# Patient Record
Sex: FEMALE | Race: WHITE | NOT HISPANIC OR LATINO | Employment: FULL TIME | URBAN - METROPOLITAN AREA
[De-identification: names, ages, dates, MRNs, and addresses within clinical notes are randomized per-mention and may not be internally consistent; named-entity substitution may affect disease eponyms.]

---

## 2017-03-18 ENCOUNTER — TELEPHONE (OUTPATIENT)
Dept: FAMILY MEDICINE | Facility: CLINIC | Age: 22
End: 2017-03-18

## 2017-04-23 DIAGNOSIS — L70.0 ACNE VULGARIS: ICD-10-CM

## 2017-04-24 NOTE — TELEPHONE ENCOUNTER
Lakisha      Last Written Prescription Date: 05/18/2016 #90 x 3  Last filled 01/31/2017  Last Office Visit with [facility], [facility] or [facility] prescribing provider: 08/18/2016 JUAN JOSE Diaz

## 2017-04-26 RX ORDER — DROSPIRENONE AND ETHINYL ESTRADIOL 0.02-3(28)
1 KIT ORAL DAILY
Qty: 30 TABLET | Refills: 0 | Status: SHIPPED | OUTPATIENT
Start: 2017-04-26 | End: 2017-08-22

## 2017-08-22 ENCOUNTER — OFFICE VISIT (OUTPATIENT)
Dept: FAMILY MEDICINE | Facility: CLINIC | Age: 22
End: 2017-08-22
Payer: COMMERCIAL

## 2017-08-22 VITALS
HEIGHT: 65 IN | SYSTOLIC BLOOD PRESSURE: 114 MMHG | HEART RATE: 71 BPM | TEMPERATURE: 98 F | DIASTOLIC BLOOD PRESSURE: 66 MMHG | WEIGHT: 136 LBS | BODY MASS INDEX: 22.66 KG/M2 | OXYGEN SATURATION: 100 %

## 2017-08-22 DIAGNOSIS — R05.9 COUGH: ICD-10-CM

## 2017-08-22 DIAGNOSIS — Z00.00 ENCOUNTER FOR ROUTINE ADULT HEALTH EXAMINATION WITHOUT ABNORMAL FINDINGS: Primary | ICD-10-CM

## 2017-08-22 DIAGNOSIS — Z30.011 ENCOUNTER FOR INITIAL PRESCRIPTION OF CONTRACEPTIVE PILLS: ICD-10-CM

## 2017-08-22 PROCEDURE — 99395 PREV VISIT EST AGE 18-39: CPT | Performed by: PHYSICIAN ASSISTANT

## 2017-08-22 RX ORDER — LEVONORGESTREL/ETHIN.ESTRADIOL 0.1-0.02MG
1 TABLET ORAL DAILY
Qty: 84 TABLET | Refills: 3 | Status: SHIPPED | OUTPATIENT
Start: 2017-08-22 | End: 2018-07-16

## 2017-08-22 NOTE — MR AVS SNAPSHOT
After Visit Summary   8/22/2017    Vira Salgado    MRN: [id number]           Patient Information     Date Of Birth          1995        Visit Information        Provider Department      8/22/2017 9:20 AM Obdulia Collier PA-C [facility]        Today's Diagnoses     Encounter for initial prescription of contraceptive pills    -  1      Care Instructions      Preventive Health Recommendations  Female Ages 18 to 25     Yearly exam:     See your health care provider every year in order to  o Review health changes.   o Discuss preventive care.    o Review your medicines if your doctor has prescribed any.      You should be tested each year for STDs (sexually transmitted diseases).       After age 20, talk to your provider about how often you should have cholesterol testing.      Starting at age 21, get a Pap test every three years. If you have an abnormal result, your doctor may have you test more often.      If you are at risk for diabetes, you should have a diabetes test (fasting glucose).     Shots:     Get a flu shot each year.     Get a tetanus shot every 10 years.     Consider getting the shot (vaccine) that prevents cervical cancer (Gardasil).    Nutrition:     Eat at least 5 servings of fruits and vegetables each day.    Eat whole-grain bread, whole-wheat pasta and brown rice instead of white grains and rice.    Talk to your provider about Calcium and Vitamin D.     Lifestyle    Exercise at least 150 minutes a week each week (30 minutes a day, 5 days a week). This will help you control your weight and prevent disease.    Limit alcohol to one drink per day.    No smoking.     Wear sunscreen to prevent skin cancer.    See your dentist every six months for an exam and cleaning.          Follow-ups after your visit        Who to contact     Normal or non-critical lab and imaging results will be communicated to you by MyChart, letter or phone within 4 business days after the  "clinic has received the results. If you do not hear from us within 7 days, please contact the clinic through [employer] or phone. If you have a critical or abnormal lab result, we will notify you by phone as soon as possible.  Submit refill requests through [employer] or call your pharmacy and they will forward the refill request to us. Please allow 3 business days for your refill to be completed.          If you need to speak with a  for additional information , please call: [phone]             Additional Information About Your Visit        [employer] Information     [employer] gives you secure access to your electronic health record. If you see a primary care provider, you can also send messages to your care team and make appointments. If you have questions, please call your primary care clinic.  If you do not have a primary care provider, please call [phone] and they will assist you.        Care EveryWhere ID     This is your Care EveryWhere ID. This could be used by other organizations to access your [place] medical records  [id number]        Your Vitals Were     Pulse Temperature Height Pulse Oximetry BMI (Body Mass Index)       71 98  F (36.7  C) (Tympanic) 5' 5.25\" (1.657 m) 100% 22.46 kg/m2        Blood Pressure from Last 3 Encounters:   08/22/17 114/66   08/18/16 110/72   05/18/16 108/74    Weight from Last 3 Encounters:   08/22/17 136 lb (61.7 kg)   08/18/16 149 lb 8 oz (67.8 kg)   05/18/16 145 lb 3.2 oz (65.9 kg)              Today, you had the following     No orders found for display         Today's Medication Changes          These changes are accurate as of: 8/22/17 10:04 AM.  If you have any questions, ask your nurse or doctor.               Start taking these medicines.        Dose/Directions    levonorgestrel-ethinyl estradiol 0.1-20 MG-MCG per tablet   Commonly known as:  KRZYSZTOF CHILDS LESSINA   Used for:  Encounter for initial prescription of contraceptive pills   Started by:  " Obdulia Collier PA-C        Dose:  1 tablet   Take 1 tablet by mouth daily   Quantity:  84 tablet   Refills:  3            Where to get your medicines      These medications were sent to [facility] - [address] [address]     Phone:  [phone]     levonorgestrel-ethinyl estradiol 0.1-20 MG-MCG per tablet                Primary Care Provider Office Phone # Fax #    Raquel Corinne Diaz PA-C [phone] [address] [place] [address]        Equal Access to Services     [facility]: Hadii aad ku hadasho Sojumanaali, waaxda luqadaha, qaybta kaalmada adeegyada, darlene marks . So [facility] [phone].    ATENCIÓN: Si habla español, tiene a white disposición servicios gratuitos de asistencia lingüística. [facility] [phone].    We comply with applicable federal civil rights laws and [place] laws. We do not discriminate on the basis of race, color, national origin, age, disability sex, sexual orientation or gender identity.            Thank you!     Thank you for choosing [facility]  for your care. Our goal is always to provide you with excellent care. Hearing back from our patients is one way we can continue to improve our services. Please take a few minutes to complete the written survey that you may receive in the mail after your visit with us. Thank you!             Your Updated Medication List - Protect others around you: Learn how to safely use, store and throw away your medicines at www.disposemymeds.org.          This list is accurate as of: 8/22/17 10:04 AM.  Always use your most recent med list.                   Brand Name Dispense Instructions for use Diagnosis    levonorgestrel-ethinyl estradiol 0.1-20 MG-MCG per tablet    AVIANE,ALESSE,LESSINA    84 tablet    Take 1 tablet by mouth daily    Encounter for initial prescription of contraceptive pills        triamcinolone 0.1 % cream    KENALOG    15 g    Apply topically 3 times daily Apply to the back of the head    Eczema

## 2017-08-22 NOTE — PROGRESS NOTES
SUBJECTIVE:   CC: Vira Salgado is an 21 year old woman who presents for preventive health visit.     [facility] for HPI/ROS submitted by the patient on 8/15/2017   Annual Exam:  Getting at least 3 servings of Calcium per day:: Yes  Bi-annual eye exam:: NO  Dental care twice a year:: NO  Sleep apnea or symptoms of sleep apnea:: None  Diet:: Regular (no restrictions)  Frequency of exercise:: 2-3 days/week  Taking medications regularly:: Yes  Medication side effects:: Not applicable  Additional concerns today:: No  PHQ-2 Score: 0  Duration of exercise:: 15-30 minutes      She has had a cough that has been ongoing for 1 month. It has not gotten better.   No congestion  No SOB or wheezing  Feels like she needs to clear her throat or her voice will get hoarse and coughing will help clear it  No fevers  Feeling well otherwise  Does have h/o allergies - usually in the fall and then to some animals  Not currently taking anything    Would also like to restart birth control  Was on it in the past - more for acne  Getting  next year so would like to restart it now  Not sexually active - has not been sexually active; waiting until marriage      Today's PHQ-2 Score:   PHQ-2 ( 1999 Pf[employer]) 8/15/2017 5/18/2016   Q1: Little interest or pleasure in doing things 0 0   Q2: Feeling down, depressed or hopeless 0 0   PHQ-2 Score 0 0   Q1: Little interest or pleasure in doing things Not at all -   Q2: Feeling down, depressed or hopeless Not at all -   PHQ-2 Score 0 -       Abuse: Current or Past(Physical, Sexual or Emotional)- No  Do you feel safe in your environment - Yes    Social History   Substance Use Topics     Smoking status: Never Smoker     Smokeless tobacco: Never Used     Alcohol use No     The patient does not drink >3 drinks per day nor >7 drinks per week.    Reviewed orders with patient.  Reviewed health maintenance and updated orders accordingly - Yes  BP Readings from Last 3 Encounters:   08/22/17  "114/66   08/18/16 110/72   05/18/16 108/74    Wt Readings from Last 3 Encounters:   08/22/17 136 lb (61.7 kg)   08/18/16 149 lb 8 oz (67.8 kg)   05/18/16 145 lb 3.2 oz (65.9 kg)                      Mammogram not appropriate for this patient based on age.    Pertinent mammograms are reviewed under the imaging tab.  History of abnormal Pap smear: NO - age 21-29 PAP every 3 years recommended  Patient declining today - says she was told by her last provider she did not need one until she became sexually active    Reviewed and updated as needed this visit by clinical staff  Tobacco  Allergies  Meds  Med Hx  Surg Hx  Fam Hx  Soc Hx        Reviewed and updated as needed this visit by Provider              ROS:  C: NEGATIVE for fever, chills, change in weight  I: NEGATIVE for worrisome rashes, moles or lesions  E: NEGATIVE for vision changes or irritation  ENT: NEGATIVE for ear, mouth and throat problems  R: NEGATIVE for significant cough or SOB  B: NEGATIVE for masses, tenderness or discharge  CV: NEGATIVE for chest pain, palpitations or peripheral edema  GI: NEGATIVE for nausea, abdominal pain, heartburn, or change in bowel habits  : NEGATIVE for unusual urinary or vaginal symptoms. Periods are regular.  M: NEGATIVE for significant arthralgias or myalgia  N: NEGATIVE for weakness, dizziness or paresthesias  P: NEGATIVE for changes in mood or affect    OBJECTIVE:   /66 (BP Location: Right arm, Patient Position: Chair, Cuff Size: Adult Regular)  Pulse 71  Temp 98  F (36.7  C) (Tympanic)  Ht 5' 5.25\" (1.657 m)  Wt 136 lb (61.7 kg)  SpO2 100%  BMI 22.46 kg/m2  EXAM:  GENERAL: healthy, alert and no distress  EYES: Eyes grossly normal to inspection, PERRL and conjunctivae and sclerae normal  HENT: ear canals and TM's normal, nose and mouth without ulcers or lesions  NECK: no adenopathy, no asymmetry, masses, or scars and thyroid normal to palpation  RESP: lungs clear to auscultation - no rales, rhonchi or " "wheezes  BREAST: normal without masses, tenderness or nipple discharge and no palpable axillary masses or adenopathy  CV: regular rate and rhythm, normal S1 S2, no S3 or S4, no murmur, click or rub, no peripheral edema and peripheral pulses strong  ABDOMEN: soft, nontender, no hepatosplenomegaly, no masses and bowel sounds normal  MS: no gross musculoskeletal defects noted, no edema  SKIN: no suspicious lesions or rashes  NEURO: Normal strength and tone, mentation intact and speech normal  PSYCH: mentation appears normal, affect normal/bright    ASSESSMENT/PLAN:   (Z00.00) Encounter for routine adult health examination without abnormal findings  (primary encounter diagnosis)  Comment:   Plan:     (R05) Cough  Comment: no red flags on exam  Plan: Start zyrtec daily to see if any improvement. Would expect that either because of the zyrtec or more time, cough would be improving over the next 2-3 weeks. If not or any worsening, follow up    (Z30.011) Encounter for initial prescription of contraceptive pills  Comment:   Plan: levonorgestrel-ethinyl estradiol         (AVIANE,ALESSE,LESSINA) 0.1-20 MG-MCG per         tablet              COUNSELING:   Reviewed preventive health counseling, as reflected in patient instructions       Regular exercise       Healthy diet/nutrition       Contraception       reports that she has never smoked. She has never used smokeless tobacco.    Estimated body mass index is 22.46 kg/(m^2) as calculated from the following:    Height as of this encounter: 5' 5.25\" (1.657 m).    Weight as of this encounter: 136 lb (61.7 kg).         Counseling Resources:  ATP IV Guidelines  Pooled Cohorts Equation Calculator  Breast Cancer Risk Calculator  FRAX Risk Assessment  ICSI Preventive Guidelines  Dietary Guidelines for Americans, 2010  USDA's MyPlate  ASA Prophylaxis  Lung CA Screening    Obdulia Collier PA-C  "

## 2017-08-22 NOTE — NURSING NOTE
"Chief Complaint   Patient presents with     Physical       Initial /66 (BP Location: Right arm, Patient Position: Chair, Cuff Size: Adult Regular)  Pulse 71  Temp 98  F (36.7  C) (Tympanic)  Ht 5' 5.25\" (1.657 m)  Wt 136 lb (61.7 kg)  SpO2 100%  BMI 22.46 kg/m2 Estimated body mass index is 22.46 kg/(m^2) as calculated from the following:    Height as of this encounter: 5' 5.25\" (1.657 m).    Weight as of this encounter: 136 lb (61.7 kg).  Medication Reconciliation: complete   Michael Huang CMA    "

## 2017-10-22 ENCOUNTER — HEALTH MAINTENANCE LETTER (OUTPATIENT)
Age: 22
End: 2017-10-22

## 2017-12-28 ENCOUNTER — OFFICE VISIT (OUTPATIENT)
Dept: DERMATOLOGY | Facility: CLINIC | Age: 22
End: 2017-12-28
Payer: COMMERCIAL

## 2017-12-28 VITALS
HEART RATE: 76 BPM | OXYGEN SATURATION: 100 % | BODY MASS INDEX: 21.47 KG/M2 | WEIGHT: 130 LBS | DIASTOLIC BLOOD PRESSURE: 77 MMHG | SYSTOLIC BLOOD PRESSURE: 130 MMHG

## 2017-12-28 DIAGNOSIS — L70.0 ACNE VULGARIS: Primary | ICD-10-CM

## 2017-12-28 LAB — BETA HCG QUAL IFA URINE: NEGATIVE

## 2017-12-28 PROCEDURE — 99203 OFFICE O/P NEW LOW 30 MIN: CPT | Performed by: PHYSICIAN ASSISTANT

## 2017-12-28 PROCEDURE — 84703 CHORIONIC GONADOTROPIN ASSAY: CPT | Performed by: PHYSICIAN ASSISTANT

## 2017-12-28 RX ORDER — DROSPIRENONE AND ETHINYL ESTRADIOL 0.02-3(28)
KIT ORAL
COMMUNITY
Start: 2017-04-26 | End: 2017-12-28 | Stop reason: ALTCHOICE

## 2017-12-28 NOTE — LETTER
12/28/2017         RE: Vira Salgado  [address] [address]        Dear Colleague,    Thank you for referring your patient, Vira Salgado, to the [facility]. Please see a copy of my visit note below.    Vira Salgado is a 22 year old year old female patient here today for acne vulgaris.  Patient is interested in starting accutane. She reports that she has been treating acne she was 12 years old. She reports that she has tried different prescription topical antibiotics and retinoids. She reports that Verenice initially worked well for her Merkue but then stopped working. Currently she is on Aviane birth control and is using clean and clear wash. She reports that she is not sexually active. She would like to get her skin clear before her wedding in July.  Patient has no other skin complaints today.  Remainder of the HPI, Meds, PMH, Allergies, FH, and SH was reviewed in chart.    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   History reviewed. No pertinent past medical history.    Past Surgical History:   Procedure Laterality Date     NO HISTORY OF SURGERY          Family History   Problem Relation Age of Onset     Blood Disease Father      leukemia     CEREBROVASCULAR DISEASE Father      Hypertension Mother      Thyroid Disease Mother      DIABETES Paternal Grandmother      Hypertension Paternal Grandmother      Asthma No family hx of      C.A.D. No family hx of      Breast Cancer No family hx of      Cancer - colorectal No family hx of      Prostate Cancer No family hx of        Social History     Social History     Marital status: Single     Spouse name: N/A     Number of children: 0     Years of education: 12+     Occupational History      Student     Social History Main Topics     Smoking status: Never Smoker     Smokeless tobacco: Never Used     Alcohol use No     Drug use: No     Sexual  activity: No     Other Topics Concern     Parent/Sibling W/ Cabg, Mi Or Angioplasty Before 65f 55m? No     Social History Narrative       Outpatient Encounter Prescriptions as of 12/28/2017   Medication Sig Dispense Refill     levonorgestrel-ethinyl estradiol (AVIANE,ALESSE,LESSINA) 0.1-20 MG-MCG per tablet Take 1 tablet by mouth daily 84 tablet 3     [DISCONTINUED] GIANVI 3-0.02 MG per tablet        triamcinolone (KENALOG) 0.1 % cream Apply topically 3 times daily Apply to the back of the head (Patient not taking: Reported on 12/28/2017) 15 g 0     No facility-administered encounter medications on file as of 12/28/2017.              Review Of Systems  Skin: As above  Eyes: negative  Ears/Nose/Throat: negative  Respiratory: No shortness of breath, dyspnea on exertion, cough, or hemoptysis  Cardiovascular: negative  Gastrointestinal: negative  Genitourinary: negative  Musculoskeletal: negative  Neurologic: negative  Psychiatric: negative  Hematologic/Lymphatic/Immunologic: negative  Endocrine: negative      O:   NAD, WDWN, Alert & Oriented, Mood & Affect wnl, Vitals stable   Here today alone   /77  Pulse 76  Wt 59 kg (130 lb)  SpO2 100%  BMI 21.47 kg/m2   General appearance normal   Vitals stable   Alert, oriented and in no acute distress      1+ inflammatory nodules and papules along lower cheeks, jawline and chin      Eyes: Conjunctivae/lids:Normal     ENT: Lips    MSK:[place]    Pulm: Breathing Normal    Neuro/Psych: Orientation:Normal; Mood/Affect:Normal  A/P:  1. Acne Vulgaris  Patient would like to start isotretinoin, will check urine hcg today.   Goal dosage: 1624-2981 mg   Patient is abstinent.   Standing CBC, CMP and fasting lipids  Ipledge reviewed with patient and Ipledge consent form complete  Patient place in ipledge system  Ipledge:[id number]  Return to clinic 30 days  Dry lips and mouth, minor swelling of the eyelids or lips, crusty skin, nosebleeds, GI upset, or thinning of hair may occur.  If any of these effects persist or worsen, tell your doctor or pharmacist promptly.   To relieve dry mouth, suck on (sugarless) hard candy or ice chips, chew (sugarless) gum, drink water.   Remember that your doctor has prescribed this medication because he or she has judged that the benefit to you is greater than the risk of side effects. Many people using this medication do not have serious side effects.   Contact office immediately if you have any of these unlikely but serious side effects: mental/mood changes (e.g., depression,  aggressive or violent behavior, and in rare cases, thoughts of suicide), tingling feeling in the skin, quick/severe sun sensitivity, back/joint/muscle pain, signs of infection (e.g., fever, persistent sore throat, painful swallowing, peeling skin on palms/soles.   Isotretinoin may infrequently cause disease of the pancreatitis, that may rarely be fatal. Stop taking this medication and contact office immediately if you develop: severe stomach pain severe or persistent GI upset,   Stop taking this medication and tell your doctor immediately if you develop these unlikely but very serious side effects: severe headache, vision changes, ear ringing, hearling loss, chest pain, yellowing eyes, skin, dark urine, severe diarrhea, rectal bleeding,   Seek immediate medical attention if you notice any symptoms of a serious allergic reaction.    Accutane is discussed fully with the patient. It is a very effective drug to treat acne vulgaris but has many potential significant side effects. Chief among these are teratogensis, hepatic injury, dyslipidemia and severe drying of the mucous membranes. All of these issues have been discussed in details. Monthly blood tests to monitor lipids and liver functions will be necessary. Expect painful dryness and/or fissuring around the lips, eyes, and other moist areas of the body. Balms may be protective. Contact lens may be too painful to wear temporarily while on  this drug. Episodes of significant depression have been reported, including suicidal ideation and attempts in rare cases. It may also cause pseudotumor cerebri and hyperostosis. The patient will report any such changes in mood, depressive symptoms or suicidal thoughts, headaches, joint or bone pains. There is also a possible association with inflammatory bowel disease, although this is unproven at this point.       Again, thank you for allowing me to participate in the care of your patient.        Sincerely,        Marie Arellano PA-C

## 2017-12-28 NOTE — NURSING NOTE
"Initial /77  Pulse 76  Wt 59 kg (130 lb)  SpO2 100%  BMI 21.47 kg/m2 Estimated body mass index is 21.47 kg/(m^2) as calculated from the following:    Height as of 8/22/17: 1.657 m (5' 5.25\").    Weight as of this encounter: 59 kg (130 lb). .    Annia Fu LPN    "

## 2017-12-28 NOTE — MR AVS SNAPSHOT
After Visit Summary   12/28/2017    Vira Salgado    MRN: [id number]           Patient Information     Date Of Birth          1995        Visit Information        Provider Department      12/28/2017 10:40 AM Marie Larson PA-C [facility]        Today's Diagnoses     Acne vulgaris    -  1       Follow-ups after your visit        Future tests that were ordered for you today     Open Standing Orders        Priority Remaining Interval Expires Ordered    Beta HCG qual IFA urine Routine 9/9 monthly  12/28/2018 12/28/2017    CBC with platelets Routine 9/9 monthly  12/28/2018 12/28/2017    Comprehensive metabolic panel Routine 9/9 monthly  12/28/2018 12/28/2017    Lipid panel reflex to direct LDL Fasting Routine 9/9 monthly 12/28/2018 12/28/2017            Who to contact     If you have questions or need follow up information about today's clinic visit or your schedule please contact [facility] directly at [phone].  Normal or non-critical lab and imaging results will be communicated to you by [employer], letter or phone within 4 business days after the clinic has received the results. If you do not hear from us within 7 days, please contact the clinic through [employer] or phone. If you have a critical or abnormal lab result, we will notify you by phone as soon as possible.  Submit refill requests through [employer] or call your pharmacy and they will forward the refill request to us. Please allow 3 business days for your refill to be completed.          Additional Information About Your Visit        MyChart Information     [employer] gives you secure access to your electronic health record. If you see a primary care provider, you can also send messages to your care team and make appointments. If you have questions, please call your primary care clinic.  If you do not have a primary care provider, please call [phone] and they will assist you.        Care EveryWhere ID      This is your Care EveryWhere ID. This could be used by other organizations to access your [place] medical records  [id number]        Your Vitals Were     Pulse Pulse Oximetry BMI (Body Mass Index)             76 100% 21.47 kg/m2          Blood Pressure from Last 3 Encounters:   12/28/17 130/77   08/22/17 114/66   08/18/16 110/72    Weight from Last 3 Encounters:   12/28/17 59 kg (130 lb)   08/22/17 61.7 kg (136 lb)   08/18/16 67.8 kg (149 lb 8 oz)              We Performed the Following     Beta HCG qual IFA urine          Today's Medication Changes          These changes are accurate as of: 12/28/17 10:32 PM.  If you have any questions, ask your nurse or doctor.               Stop taking these medicines if you haven't already. Please contact your care team if you have questions.     GIANVI 3-0.02 MG per tablet   Generic drug:  drospirenone-ethinyl estradiol   Stopped by:  Marie Larson PA-C                    Primary Care Provider Office Phone # Fax #    Raquel Corinne Diaz PA-C [phone] [address] [place] [facility] [address]        Equal Access to Services     [facility]: Hadii reyes cherryo Socamille, waaxda luqadaha, qaybta kaalmada adeegyada, darlene campbell. So [facility] [phone].    ATENCIÓN: Si habla español, tiene a white disposición servicios gratuitos de asistencia lingüística. Gab al [phone].    We comply with applicable federal civil rights laws and [place] laws. We do not discriminate on the basis of race, color, national origin, age, disability, sex, sexual orientation, or gender identity.            Thank you!     Thank you for choosing [facility]  for your care. Our goal is always to provide you with excellent care. Hearing back from our patients is one way we can continue to improve our services. Please take a few minutes to complete the written survey that you may receive in the mail after your visit with us.  Thank you!             Your Updated Medication List - Protect others around you: Learn how to safely use, store and throw away your medicines at www.disposemymeds.org.          This list is accurate as of: 12/28/17 10:32 PM.  Always use your most recent med list.                   Brand Name Dispense Instructions for use Diagnosis    levonorgestrel-ethinyl estradiol 0.1-20 MG-MCG per tablet    AVIANE,ALESSE,LESSINA    84 tablet    Take 1 tablet by mouth daily    Encounter for initial prescription of contraceptive pills       triamcinolone 0.1 % cream    KENALOG    15 g    Apply topically 3 times daily Apply to the back of the head    Eczema

## 2017-12-29 NOTE — PROGRESS NOTES
Vira Salgado is a 22 year old year old female patient here today for acne vulgaris.  Patient is interested in starting accutane. She reports that she has been treating acne she was 12 years old. She reports that she has tried different prescription topical antibiotics and retinoids. She reports that Verenice initially worked well for her ance but then stopped working. Currently she is on Aviane birth control and is using clean and clear wash. She reports that she is not sexually active. She would like to get her skin clear before her wedding in July.  Patient has no other skin complaints today.  Remainder of the HPI, Meds, PMH, Allergies, FH, and SH was reviewed in chart.    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   History reviewed. No pertinent past medical history.    Past Surgical History:   Procedure Laterality Date     NO HISTORY OF SURGERY          Family History   Problem Relation Age of Onset     Blood Disease Father      leukemia     CEREBROVASCULAR DISEASE Father      Hypertension Mother      Thyroid Disease Mother      DIABETES Paternal Grandmother      Hypertension Paternal Grandmother      Asthma No family hx of      C.A.D. No family hx of      Breast Cancer No family hx of      Cancer - colorectal No family hx of      Prostate Cancer No family hx of        Social History     Social History     Marital status: Single     Spouse name: N/A     Number of children: 0     Years of education: 12+     Occupational History      Student     Social History Main Topics     Smoking status: Never Smoker     Smokeless tobacco: Never Used     Alcohol use No     Drug use: No     Sexual activity: No     Other Topics Concern     Parent/Sibling W/ Cabg, Mi Or Angioplasty Before 65f 55m? No     Social History Narrative       Outpatient Encounter Prescriptions as of 12/28/2017   Medication Sig Dispense Refill      levonorgestrel-ethinyl estradiol (AVIANE,ALESSE,LESSINA) 0.1-20 MG-MCG per tablet Take 1 tablet by mouth daily 84 tablet 3     [DISCONTINUED] GIANVI 3-0.02 MG per tablet        triamcinolone (KENALOG) 0.1 % cream Apply topically 3 times daily Apply to the back of the head (Patient not taking: Reported on 12/28/2017) 15 g 0     No facility-administered encounter medications on file as of 12/28/2017.              Review Of Systems  Skin: As above  Eyes: negative  Ears/Nose/Throat: negative  Respiratory: No shortness of breath, dyspnea on exertion, cough, or hemoptysis  Cardiovascular: negative  Gastrointestinal: negative  Genitourinary: negative  Musculoskeletal: negative  Neurologic: negative  Psychiatric: negative  Hematologic/Lymphatic/Immunologic: negative  Endocrine: negative      O:   NAD, WDWN, Alert & Oriented, Mood & Affect wnl, Vitals stable   Here today alone   /77  Pulse 76  Wt 59 kg (130 lb)  SpO2 100%  BMI 21.47 kg/m2   General appearance normal   Vitals stable   Alert, oriented and in no acute distress      1+ inflammatory nodules and papules along lower cheeks, jawline and chin      Eyes: Conjunctivae/lids:Normal     ENT: Lips    MSK:[place]    Pulm: Breathing Normal    Neuro/Psych: Orientation:Normal; Mood/Affect:Normal  A/P:  1. Acne Vulgaris  Patient would like to start isotretinoin, will check urine hcg today.   Goal dosage: 7968-8965 mg   Patient is abstinent.   Standing CBC, CMP and fasting lipids  Ipledge reviewed with patient and Ipledge consent form complete  Patient place in ipledge system  Ipledge:[id number]  Return to clinic 30 days  Dry lips and mouth, minor swelling of the eyelids or lips, crusty skin, nosebleeds, GI upset, or thinning of hair may occur. If any of these effects persist or worsen, tell your doctor or pharmacist promptly.   To relieve dry mouth, suck on (sugarless) hard candy or ice chips, chew (sugarless) gum, drink water.   Remember that your doctor has  prescribed this medication because he or she has judged that the benefit to you is greater than the risk of side effects. Many people using this medication do not have serious side effects.   Contact office immediately if you have any of these unlikely but serious side effects: mental/mood changes (e.g., depression,  aggressive or violent behavior, and in rare cases, thoughts of suicide), tingling feeling in the skin, quick/severe sun sensitivity, back/joint/muscle pain, signs of infection (e.g., fever, persistent sore throat, painful swallowing, peeling skin on palms/soles.   Isotretinoin may infrequently cause disease of the pancreatitis, that may rarely be fatal. Stop taking this medication and contact office immediately if you develop: severe stomach pain severe or persistent GI upset,   Stop taking this medication and tell your doctor immediately if you develop these unlikely but very serious side effects: severe headache, vision changes, ear ringing, hearling loss, chest pain, yellowing eyes, skin, dark urine, severe diarrhea, rectal bleeding,   Seek immediate medical attention if you notice any symptoms of a serious allergic reaction.    Accutane is discussed fully with the patient. It is a very effective drug to treat acne vulgaris but has many potential significant side effects. Chief among these are teratogensis, hepatic injury, dyslipidemia and severe drying of the mucous membranes. All of these issues have been discussed in details. Monthly blood tests to monitor lipids and liver functions will be necessary. Expect painful dryness and/or fissuring around the lips, eyes, and other moist areas of the body. Balms may be protective. Contact lens may be too painful to wear temporarily while on this drug. Episodes of significant depression have been reported, including suicidal ideation and attempts in rare cases. It may also cause pseudotumor cerebri and hyperostosis. The patient will report any such changes  in mood, depressive symptoms or suicidal thoughts, headaches, joint or bone pains. There is also a possible association with inflammatory bowel disease, although this is unproven at this point.

## 2018-01-25 ENCOUNTER — TELEPHONE (OUTPATIENT)
Dept: FAMILY MEDICINE | Facility: CLINIC | Age: 23
End: 2018-01-25

## 2018-01-26 ENCOUNTER — OFFICE VISIT (OUTPATIENT)
Dept: DERMATOLOGY | Facility: CLINIC | Age: 23
End: 2018-01-26
Payer: COMMERCIAL

## 2018-01-26 VITALS — OXYGEN SATURATION: 100 % | SYSTOLIC BLOOD PRESSURE: 118 MMHG | DIASTOLIC BLOOD PRESSURE: 69 MMHG | HEART RATE: 70 BPM

## 2018-01-26 DIAGNOSIS — L70.0 ACNE VULGARIS: ICD-10-CM

## 2018-01-26 DIAGNOSIS — L70.0 ACNE VULGARIS: Primary | ICD-10-CM

## 2018-01-26 LAB
ALBUMIN SERPL-MCNC: 3.8 G/DL (ref 3.4–5)
ALP SERPL-CCNC: 63 U/L (ref 40–150)
ALT SERPL W P-5'-P-CCNC: 12 U/L (ref 0–50)
ANION GAP SERPL CALCULATED.3IONS-SCNC: 5 MMOL/L (ref 3–14)
AST SERPL W P-5'-P-CCNC: 14 U/L (ref 0–45)
BETA HCG QUAL IFA URINE: NEGATIVE
BILIRUB SERPL-MCNC: 1.4 MG/DL (ref 0.2–1.3)
BUN SERPL-MCNC: 9 MG/DL (ref 7–30)
CALCIUM SERPL-MCNC: 8.9 MG/DL (ref 8.5–10.1)
CHLORIDE SERPL-SCNC: 105 MMOL/L (ref 94–109)
CHOLEST SERPL-MCNC: 196 MG/DL
CO2 SERPL-SCNC: 26 MMOL/L (ref 20–32)
CREAT SERPL-MCNC: 0.83 MG/DL (ref 0.52–1.04)
ERYTHROCYTE [DISTWIDTH] IN BLOOD BY AUTOMATED COUNT: 12.7 % (ref 10–15)
GFR SERPL CREATININE-BSD FRML MDRD: 84 ML/MIN/1.7M2
GLUCOSE SERPL-MCNC: 84 MG/DL (ref 70–99)
HCT VFR BLD AUTO: 40.7 % (ref 35–47)
HDLC SERPL-MCNC: 56 MG/DL
HGB BLD-MCNC: 13.3 G/DL (ref 11.7–15.7)
LDLC SERPL CALC-MCNC: 111 MG/DL
MCH RBC QN AUTO: 29.4 PG (ref 26.5–33)
MCHC RBC AUTO-ENTMCNC: 32.7 G/DL (ref 31.5–36.5)
MCV RBC AUTO: 90 FL (ref 78–100)
NONHDLC SERPL-MCNC: 140 MG/DL
PLATELET # BLD AUTO: 249 10E9/L (ref 150–450)
POTASSIUM SERPL-SCNC: 3.8 MMOL/L (ref 3.4–5.3)
PROT SERPL-MCNC: 7.5 G/DL (ref 6.8–8.8)
RBC # BLD AUTO: 4.53 10E12/L (ref 3.8–5.2)
SODIUM SERPL-SCNC: 136 MMOL/L (ref 133–144)
TRIGL SERPL-MCNC: 146 MG/DL
WBC # BLD AUTO: 8 10E9/L (ref 4–11)

## 2018-01-26 PROCEDURE — 80061 LIPID PANEL: CPT | Performed by: PHYSICIAN ASSISTANT

## 2018-01-26 PROCEDURE — 85027 COMPLETE CBC AUTOMATED: CPT | Performed by: PHYSICIAN ASSISTANT

## 2018-01-26 PROCEDURE — 84703 CHORIONIC GONADOTROPIN ASSAY: CPT | Performed by: PHYSICIAN ASSISTANT

## 2018-01-26 PROCEDURE — 36415 COLL VENOUS BLD VENIPUNCTURE: CPT | Performed by: PHYSICIAN ASSISTANT

## 2018-01-26 PROCEDURE — 99213 OFFICE O/P EST LOW 20 MIN: CPT | Performed by: PHYSICIAN ASSISTANT

## 2018-01-26 PROCEDURE — 80053 COMPREHEN METABOLIC PANEL: CPT | Performed by: PHYSICIAN ASSISTANT

## 2018-01-26 RX ORDER — ISOTRETINOIN 40 MG/1
40 CAPSULE ORAL
Qty: 30 CAPSULE | Refills: 0 | Status: SHIPPED | OUTPATIENT
Start: 2018-01-26 | End: 2018-04-05

## 2018-01-26 NOTE — LETTER
1/26/2018         RE: Vira Salgado  [address] [address]        Dear Colleague,    Thank you for referring your patient, Vira Salgado, to the [facility]. Please see a copy of my visit note below.    Vira Salgado is a 22 year old year old female patient here today to start isotretinoin. She reports that she has been treating acne she was 12 years old. She reports that she has tried different prescription topical antibiotics and retinoids. She also tried doxycycline which caused stomach upset.  She reports that Verenice initially worked well for her [employer] but then stopped working. Currently she is on Aviane birth control and is using clean and clear wash. She reports that she is not sexually active. She would like to get her skin clear before her wedding in July.  Patient has no other skin complaints today.  Remainder of the HPI, Meds, PMH, Allergies, FH, and SH was reviewed in chart.    Pertinent Hx:  Acne Vulgaris   No past medical history on file.    Past Surgical History:   Procedure Laterality Date     NO HISTORY OF SURGERY          Family History   Problem Relation Age of Onset     Blood Disease Father      leukemia     CEREBROVASCULAR DISEASE Father      Hypertension Mother      Thyroid Disease Mother      DIABETES Paternal Grandmother      Hypertension Paternal Grandmother      Asthma No family hx of      C.A.D. No family hx of      Breast Cancer No family hx of      Cancer - colorectal No family hx of      Prostate Cancer No family hx of        Social History     Social History     Marital status: Single     Spouse name: N/A     Number of children: 0     Years of education: 12+     Occupational History      Student     Social History Main Topics     Smoking status: Never Smoker     Smokeless tobacco: Never Used     Alcohol use No     Drug use: No     Sexual activity: No     Other Topics Concern     Parent/Sibling W/ Cabg, Mi Or Angioplasty Before 65f 55m? No     Social  History Narrative       Outpatient Encounter Prescriptions as of 1/26/2018   Medication Sig Dispense Refill     ISOtretinoin (ACCUTANE) 40 MG capsule Take 1 capsule (40 mg) by mouth daily with food ipledge # [id number] 30 capsule 0     levonorgestrel-ethinyl estradiol (AVIANE,ALESSE,LESSINA) 0.1-20 MG-MCG per tablet Take 1 tablet by mouth daily 84 tablet 3     triamcinolone (KENALOG) 0.1 % cream Apply topically 3 times daily Apply to the back of the head 15 g 0     No facility-administered encounter medications on file as of 1/26/2018.              Review Of Systems  Skin: As above  Eyes: negative  Ears/Nose/Throat: negative  Respiratory: No shortness of breath, dyspnea on exertion, cough, or hemoptysis  Cardiovascular: negative  Gastrointestinal: negative  Genitourinary: negative  Musculoskeletal: negative  Neurologic: negative  Psychiatric: negative  Hematologic/Lymphatic/Immunologic: negative  Endocrine: negative      O:   NAD, WDWN, Alert & Oriented, Mood & Affect wnl, Vitals stable   Here today alone   /69  Pulse 70  SpO2 100%   General appearance normal   Vitals stable   Alert, oriented and in no acute distress       1+ inflammatory nodules and papules along lower cheeks, jawline and chin      Eyes: Conjunctivae/lids:Normal     ENT: Lips normal    MSK:Normal    Pulm: Breathing Normal    Neuro/Psych: Orientation:Normal; Mood/Affect:Normal  A/P:  1. Acne vulgaris   Start isotretinoin 40 mg daily.  Stop all OTC/prescription medication for acne.   Goal dosage: 1525-5490 mg   Patient is abstinent.   Standing CBC, CMP and fasting lipids  Ipledge reviewed with patient and Ipledge consent form complete  Patient place in ipledge system  Ipledge:[id number]  Return to clinic 30 days  Dry lips and mouth, minor swelling of the eyelids or lips, crusty skin, nosebleeds, GI upset, or thinning of hair may occur. If any of these effects persist or worsen, tell your doctor or pharmacist promptly.   To relieve dry mouth,  suck on (sugarless) hard candy or ice chips, chew (sugarless) gum, drink water.   Remember that your doctor has prescribed this medication because he or she has judged that the benefit to you is greater than the risk of side effects. Many people using this medication do not have serious side effects.   Contact office immediately if you have any of these unlikely but serious side effects: mental/mood changes (e.g., depression,  aggressive or violent behavior, and in rare cases, thoughts of suicide), tingling feeling in the skin, quick/severe sun sensitivity, back/joint/muscle pain, signs of infection (e.g., fever, persistent sore throat, painful swallowing, peeling skin on palms/soles.   Isotretinoin may infrequently cause disease of the pancreatitis, that may rarely be fatal. Stop taking this medication and contact office immediately if you develop: severe stomach pain severe or persistent GI upset,   Stop taking this medication and tell your doctor immediately if you develop these unlikely but very serious side effects: severe headache, vision changes, ear ringing, hearling loss, chest pain, yellowing eyes, skin, dark urine, severe diarrhea, rectal bleeding,   Seek immediate medical attention if you notice any symptoms of a serious allergic reaction.     Accutane is discussed fully with the patient. It is a very effective drug to treat acne vulgaris but has many potential significant side effects. Chief among these are teratogensis, hepatic injury, dyslipidemia and severe drying of the mucous membranes. All of these issues have been discussed in details. Monthly blood tests to monitor lipids and liver functions will be necessary. Expect painful dryness and/or fissuring around the lips, eyes, and other moist areas of the body. Balms may be protective. Contact lens may be too painful to wear temporarily while on this drug. Episodes of significant depression have been reported, including suicidal ideation and attempts  in rare cases. It may also cause pseudotumor cerebri and hyperostosis. The patient will report any such changes in mood, depressive symptoms or suicidal thoughts, headaches, joint or bone pains. There is also a possible association with inflammatory bowel disease, although this is unproven at this point.        Again, thank you for allowing me to participate in the care of your patient.        Sincerely,        Marie Arellano PA-C

## 2018-01-26 NOTE — MR AVS SNAPSHOT
After Visit Summary   1/26/2018    Vira Salgado    MRN: [id number]           Patient Information     Date Of Birth          1995        Visit Information        Provider Department      1/26/2018 11:40 AM Marie Larson PA-C [facility]        Today's Diagnoses     Acne vulgaris    -  1       Follow-ups after your visit        Your next 10 appointments already scheduled     Feb 23, 2018 10:00 AM CST   MyChart Dermatology Return with Marie Larson PA-C   [facility] ([facility]    [address] [address]   [phone]            Mar 23, 2018 10:00 AM CDT   MyChart Dermatology Return with Marie Larson PA-C   [facility] ([facility]    [address] [address]   [phone]            Apr 20, 2018 10:00 AM CDT   MyChart Dermatology Return with Marie Larson PA-C   [facility] ([facility]    [address] [address]   [phone]            May 25, 2018 10:00 AM CDT   MyChart Dermatology Return with Marie Larson PA-C   [facility] ([facility])    [address] [address]   [phone]              Who to contact     If you have questions or need follow up information about today's clinic visit or your schedule please contact [facility] directly at [phone].  Normal or non-critical lab and imaging results will be communicated to you by MyChart, letter or phone within 4 business days after the clinic has received the results. If you do not hear from us within 7 days, please contact the clinic through MyChart or phone. If you have a critical or abnormal lab result, we will notify you by phone as soon as possible.  Submit refill requests through [employer] or call your pharmacy and they will forward the refill request to us. Please  allow 3 business days for your refill to be completed.          Additional Information About Your Visit        MyChart Information     [employer] gives you secure access to your electronic health record. If you see a primary care provider, you can also send messages to your care team and make appointments. If you have questions, please call your primary care clinic.  If you do not have a primary care provider, please call [phone] and they will assist you.        Care EveryWhere ID     This is your Care EveryWhere ID. This could be used by other organizations to access your [facility] medical records  [id number]        Your Vitals Were     Pulse Pulse Oximetry                70 100%           Blood Pressure from Last 3 Encounters:   01/26/18 118/69   12/28/17 130/77   08/22/17 114/66    Weight from Last 3 Encounters:   12/28/17 59 kg (130 lb)   08/22/17 61.7 kg (136 lb)   08/18/16 67.8 kg (149 lb 8 oz)              Today, you had the following     No orders found for display         Today's Medication Changes          These changes are accurate as of 1/26/18  1:01 PM.  If you have any questions, ask your nurse or doctor.               Start taking these medicines.        Dose/Directions    ISOtretinoin 40 MG capsule   Commonly known as:  ACCUTANE   Used for:  Acne vulgaris   Started by:  Marie Larson PA-C        Dose:  40 mg   Take 1 capsule (40 mg) by mouth daily with food ipledge # [id number]   Quantity:  30 capsule   Refills:  0            Where to get your medicines      Some of these will need a paper prescription and others can be bought over the counter.  Ask your nurse if you have questions.     Bring a paper prescription for each of these medications     ISOtretinoin 40 MG capsule                Primary Care Provider Office Phone # Fax #    Raquel Diaz PA-C [phone] [address] [place] [address]        Equal Access to Services     ELENA BOJORQUEZ: Carisa  reyes Bates, wakirkda emmanuelleadaha, qaybta kaaltavia theronherberth, waxroberto joni escobarclarissabucky marks shannan. So [facility] [phone].    ATENCIÓN: Si habla español, tiene a white disposición servicios gratuitos de asistencia lingüística. Zoraname al [phone].    We comply with applicable federal civil rights laws and [place] laws. We do not discriminate on the basis of race, color, national origin, age, disability, sex, sexual orientation, or gender identity.            Thank you!     Thank you for choosing [facility]  for your care. Our goal is always to provide you with excellent care. Hearing back from our patients is one way we can continue to improve our services. Please take a few minutes to complete the written survey that you may receive in the mail after your visit with us. Thank you!             Your Updated Medication List - Protect others around you: Learn how to safely use, store and throw away your medicines at www.disposemymeds.org.          This list is accurate as of 1/26/18  1:01 PM.  Always use your most recent med list.                   Brand Name Dispense Instructions for use Diagnosis    ISOtretinoin 40 MG capsule    ACCUTANE    30 capsule    Take 1 capsule (40 mg) by mouth daily with food ipledge # [id number]    Acne vulgaris       levonorgestrel-ethinyl estradiol 0.1-20 MG-MCG per tablet    AVIANE,ALESSE,LESSINA    84 tablet    Take 1 tablet by mouth daily    Encounter for initial prescription of contraceptive pills       triamcinolone 0.1 % cream    KENALOG    15 g    Apply topically 3 times daily Apply to the back of the head    Eczema

## 2018-01-26 NOTE — PROGRESS NOTES
Vira Salgado is a 22 year old year old female patient here today to start isotretinoin. She reports that she has been treating acne she was 12 years old. She reports that she has tried different prescription topical antibiotics and retinoids. She also tried doxycycline which caused stomach upset.  She reports that Verenice initially worked well for her ance but then stopped working. Currently she is on Aviane birth control and is using clean and clear wash. She reports that she is not sexually active. She would like to get her skin clear before her wedding in July.  Patient has no other skin complaints today.  Remainder of the HPI, Meds, PMH, Allergies, FH, and SH was reviewed in chart.    Pertinent Hx:  Acne Vulgaris   No past medical history on file.    Past Surgical History:   Procedure Laterality Date     NO HISTORY OF SURGERY          Family History   Problem Relation Age of Onset     Blood Disease Father      leukemia     CEREBROVASCULAR DISEASE Father      Hypertension Mother      Thyroid Disease Mother      DIABETES Paternal Grandmother      Hypertension Paternal Grandmother      Asthma No family hx of      C.A.D. No family hx of      Breast Cancer No family hx of      Cancer - colorectal No family hx of      Prostate Cancer No family hx of        Social History     Social History     Marital status: Single     Spouse name: N/A     Number of children: 0     Years of education: 12+     Occupational History      Student     Social History Main Topics     Smoking status: Never Smoker     Smokeless tobacco: Never Used     Alcohol use No     Drug use: No     Sexual activity: No     Other Topics Concern     Parent/Sibling W/ Cabg, Mi Or Angioplasty Before 65f 55m? No     Social History Narrative       Outpatient Encounter Prescriptions as of 1/26/2018   Medication Sig Dispense Refill     ISOtretinoin (ACCUTANE) 40 MG capsule Take 1 capsule (40 mg) by mouth daily with food ipledge # [id number] 30 capsule 0      levonorgestrel-ethinyl estradiol (AVIANE,ALESSE,LESSINA) 0.1-20 MG-MCG per tablet Take 1 tablet by mouth daily 84 tablet 3     triamcinolone (KENALOG) 0.1 % cream Apply topically 3 times daily Apply to the back of the head 15 g 0     No facility-administered encounter medications on file as of 1/26/2018.              Review Of Systems  Skin: As above  Eyes: negative  Ears/Nose/Throat: negative  Respiratory: No shortness of breath, dyspnea on exertion, cough, or hemoptysis  Cardiovascular: negative  Gastrointestinal: negative  Genitourinary: negative  Musculoskeletal: negative  Neurologic: negative  Psychiatric: negative  Hematologic/Lymphatic/Immunologic: negative  Endocrine: negative      O:   NAD, WDWN, Alert & Oriented, Mood & Affect wnl, Vitals stable   Here today alone   /69  Pulse 70  SpO2 100%   General appearance normal   Vitals stable   Alert, oriented and in no acute distress       1+ inflammatory nodules and papules along lower cheeks, jawline and chin      Eyes: Conjunctivae/lids:Normal     ENT: Lips normal    MSK:Normal    Pulm: Breathing Normal    Neuro/Psych: Orientation:Normal; Mood/Affect:Normal  A/P:  1. Acne vulgaris   Start isotretinoin 40 mg daily.  Stop all OTC/prescription medication for acne.   Goal dosage: 4616-8841 mg   Patient is abstinent.   Standing CBC, CMP and fasting lipids  Ipledge reviewed with patient and Ipledge consent form complete  Patient place in ipledge system  Ipledge:[id number]  Return to clinic 30 days  Dry lips and mouth, minor swelling of the eyelids or lips, crusty skin, nosebleeds, GI upset, or thinning of hair may occur. If any of these effects persist or worsen, tell your doctor or pharmacist promptly.   To relieve dry mouth, suck on (sugarless) hard candy or ice chips, chew (sugarless) gum, drink water.   Remember that your doctor has prescribed this medication because he or she has judged that the benefit to you is greater than the risk of side effects.  Many people using this medication do not have serious side effects.   Contact office immediately if you have any of these unlikely but serious side effects: mental/mood changes (e.g., depression,  aggressive or violent behavior, and in rare cases, thoughts of suicide), tingling feeling in the skin, quick/severe sun sensitivity, back/joint/muscle pain, signs of infection (e.g., fever, persistent sore throat, painful swallowing, peeling skin on palms/soles.   Isotretinoin may infrequently cause disease of the pancreatitis, that may rarely be fatal. Stop taking this medication and contact office immediately if you develop: severe stomach pain severe or persistent GI upset,   Stop taking this medication and tell your doctor immediately if you develop these unlikely but very serious side effects: severe headache, vision changes, ear ringing, hearling loss, chest pain, yellowing eyes, skin, dark urine, severe diarrhea, rectal bleeding,   Seek immediate medical attention if you notice any symptoms of a serious allergic reaction.     Accutane is discussed fully with the patient. It is a very effective drug to treat acne vulgaris but has many potential significant side effects. Chief among these are teratogensis, hepatic injury, dyslipidemia and severe drying of the mucous membranes. All of these issues have been discussed in details. Monthly blood tests to monitor lipids and liver functions will be necessary. Expect painful dryness and/or fissuring around the lips, eyes, and other moist areas of the body. Balms may be protective. Contact lens may be too painful to wear temporarily while on this drug. Episodes of significant depression have been reported, including suicidal ideation and attempts in rare cases. It may also cause pseudotumor cerebri and hyperostosis. The patient will report any such changes in mood, depressive symptoms or suicidal thoughts, headaches, joint or bone pains. There is also a possible association  with inflammatory bowel disease, although this is unproven at this point.

## 2018-01-26 NOTE — NURSING NOTE
"Initial /69  Pulse 70  SpO2 100% Estimated body mass index is 21.47 kg/(m^2) as calculated from the following:    Height as of 8/22/17: 1.657 m (5' 5.25\").    Weight as of 12/28/17: 59 kg (130 lb). .      "

## 2018-02-23 ENCOUNTER — OFFICE VISIT (OUTPATIENT)
Dept: DERMATOLOGY | Facility: CLINIC | Age: 23
End: 2018-02-23
Payer: COMMERCIAL

## 2018-02-23 VITALS
SYSTOLIC BLOOD PRESSURE: 122 MMHG | OXYGEN SATURATION: 100 % | HEART RATE: 68 BPM | RESPIRATION RATE: 16 BRPM | TEMPERATURE: 99 F | DIASTOLIC BLOOD PRESSURE: 71 MMHG

## 2018-02-23 DIAGNOSIS — L70.0 ACNE VULGARIS: ICD-10-CM

## 2018-02-23 DIAGNOSIS — L70.0 ACNE VULGARIS: Primary | ICD-10-CM

## 2018-02-23 LAB
ALBUMIN SERPL-MCNC: 3.7 G/DL (ref 3.4–5)
ALP SERPL-CCNC: 64 U/L (ref 40–150)
ALT SERPL W P-5'-P-CCNC: 19 U/L (ref 0–50)
ANION GAP SERPL CALCULATED.3IONS-SCNC: 6 MMOL/L (ref 3–14)
AST SERPL W P-5'-P-CCNC: 19 U/L (ref 0–45)
BETA HCG QUAL IFA URINE: NEGATIVE
BILIRUB SERPL-MCNC: 1.3 MG/DL (ref 0.2–1.3)
BUN SERPL-MCNC: 15 MG/DL (ref 7–30)
CALCIUM SERPL-MCNC: 9.1 MG/DL (ref 8.5–10.1)
CHLORIDE SERPL-SCNC: 104 MMOL/L (ref 94–109)
CHOLEST SERPL-MCNC: 229 MG/DL
CO2 SERPL-SCNC: 27 MMOL/L (ref 20–32)
CREAT SERPL-MCNC: 0.76 MG/DL (ref 0.52–1.04)
ERYTHROCYTE [DISTWIDTH] IN BLOOD BY AUTOMATED COUNT: 12.4 % (ref 10–15)
GFR SERPL CREATININE-BSD FRML MDRD: >90 ML/MIN/1.7M2
GLUCOSE SERPL-MCNC: 83 MG/DL (ref 70–99)
HCT VFR BLD AUTO: 40.9 % (ref 35–47)
HDLC SERPL-MCNC: 48 MG/DL
HGB BLD-MCNC: 13.4 G/DL (ref 11.7–15.7)
LDLC SERPL CALC-MCNC: 161 MG/DL
MCH RBC QN AUTO: 29.1 PG (ref 26.5–33)
MCHC RBC AUTO-ENTMCNC: 32.8 G/DL (ref 31.5–36.5)
MCV RBC AUTO: 89 FL (ref 78–100)
NONHDLC SERPL-MCNC: 181 MG/DL
PLATELET # BLD AUTO: 265 10E9/L (ref 150–450)
POTASSIUM SERPL-SCNC: 3.9 MMOL/L (ref 3.4–5.3)
PROT SERPL-MCNC: 8 G/DL (ref 6.8–8.8)
RBC # BLD AUTO: 4.6 10E12/L (ref 3.8–5.2)
SODIUM SERPL-SCNC: 137 MMOL/L (ref 133–144)
TRIGL SERPL-MCNC: 101 MG/DL
WBC # BLD AUTO: 9.7 10E9/L (ref 4–11)

## 2018-02-23 PROCEDURE — 36415 COLL VENOUS BLD VENIPUNCTURE: CPT | Performed by: PHYSICIAN ASSISTANT

## 2018-02-23 PROCEDURE — 80061 LIPID PANEL: CPT | Performed by: PHYSICIAN ASSISTANT

## 2018-02-23 PROCEDURE — 85027 COMPLETE CBC AUTOMATED: CPT | Performed by: PHYSICIAN ASSISTANT

## 2018-02-23 PROCEDURE — 80053 COMPREHEN METABOLIC PANEL: CPT | Performed by: PHYSICIAN ASSISTANT

## 2018-02-23 PROCEDURE — 99213 OFFICE O/P EST LOW 20 MIN: CPT | Performed by: PHYSICIAN ASSISTANT

## 2018-02-23 PROCEDURE — 84703 CHORIONIC GONADOTROPIN ASSAY: CPT | Performed by: PHYSICIAN ASSISTANT

## 2018-02-23 RX ORDER — ISOTRETINOIN 20 MG/1
CAPSULE ORAL
Qty: 30 CAPSULE | Refills: 0 | Status: SHIPPED | OUTPATIENT
Start: 2018-02-23 | End: 2018-03-23

## 2018-02-23 RX ORDER — ISOTRETINOIN 40 MG/1
CAPSULE ORAL
Qty: 30 CAPSULE | Refills: 0 | Status: SHIPPED | OUTPATIENT
Start: 2018-02-23 | End: 2018-03-23

## 2018-02-23 NOTE — LETTER
2/23/2018         RE: Vira Salgado  [address] [facility] [id number]        Dear Colleague,    Thank you for referring your patient, Vira Salgado, to the [facility]. Please see a copy of my visit note below.    Vira Salgado is a 22 year old year old female patient here today for recheck acne vulgaris. Patient has finished one month of isotretinoin at 40 mg daily. She reports she has noticed minor improvement. She notes mild dryness and denies any side effects. No mood changes.  Patient has no other skin complaints today.  Remainder of the HPI, Meds, PMH, Allergies, FH, and SH was reviewed in chart.    Pertinent Hx:   Acne Vulgaris   History reviewed. No pertinent past medical history.    Past Surgical History:   Procedure Laterality Date     NO HISTORY OF SURGERY          Family History   Problem Relation Age of Onset     Blood Disease Father      leukemia     CEREBROVASCULAR DISEASE Father      Hypertension Mother      Thyroid Disease Mother      DIABETES Paternal Grandmother      Hypertension Paternal Grandmother      Asthma No family hx of      C.A.D. No family hx of      Breast Cancer No family hx of      Cancer - colorectal No family hx of      Prostate Cancer No family hx of        Social History     Social History     Marital status: Single     Spouse name: N/A     Number of children: 0     Years of education: 12+     Occupational History      Student     Social History Main Topics     Smoking status: Never Smoker     Smokeless tobacco: Never Used     Alcohol use No     Drug use: No     Sexual activity: No     Other Topics Concern     Parent/Sibling W/ Cabg, Mi Or Angioplasty Before 65f 55m? No     Social History Narrative       Outpatient Encounter Prescriptions as of 2/23/2018   Medication Sig Dispense Refill     ISOtretinoin (ACCUTANE) 40 MG capsule Take one tablet daily in the evening. 30 capsule 0     ISOtretinoin (ACCUTANE) 20 MG capsule Take one tablet daily in the  morning. 30 capsule 0     ISOtretinoin (ACCUTANE) 40 MG capsule Take 1 capsule (40 mg) by mouth daily with food ipledge # [id number] 30 capsule 0     levonorgestrel-ethinyl estradiol (AVIANE,ALESSE,LESSINA) 0.1-20 MG-MCG per tablet Take 1 tablet by mouth daily 84 tablet 3     triamcinolone (KENALOG) 0.1 % cream Apply topically 3 times daily Apply to the back of the head 15 g 0     No facility-administered encounter medications on file as of 2/23/2018.              Review Of Systems  Skin: As above  Eyes: negative  Ears/Nose/Throat: negative  Respiratory: No shortness of breath, dyspnea on exertion, cough, or hemoptysis  Cardiovascular: negative  Gastrointestinal: negative  Genitourinary: negative  Musculoskeletal: negative  Neurologic: negative  Psychiatric: negative  Hematologic/Lymphatic/Immunologic: negative  Endocrine: negative      O:   NAD, WDWN, Alert & Oriented, Mood & Affect wnl, Vitals stable   Here today alone   /71  Pulse 68  Temp 99  F (37.2  C) (Tympanic)  Resp 16  SpO2 100%   General appearance normal   Vitals stable   Alert, oriented and in no acute distress     1+ inflammatory nodules and papules along lower cheeks, jawline and chin       Eyes: Conjunctivae/lids:Normal     ENT: Lips: normal    MSK:Normal    Pulm: Breathing Normal    Neuro/Psych: Orientation:Normal; Mood/Affect:Normal  A/P:  1. Acne vulgaris   Increase 30 mg twice daily with food.   Current Dosage: 1200 mg   Goal dosage: 4678-0561 mg   Patient is abstinent.   Standing CBC, CMP and fasting lipids  Ipledge reviewed with patient and Ipledge consent form complete  Patient place in ipledge system  Ipledge:[id number]  Return to clinic 30 days  Dry lips and mouth, minor swelling of the eyelids or lips, crusty skin, nosebleeds, GI upset, or thinning of hair may occur. If any of these effects persist or worsen, tell your doctor or pharmacist promptly.   To relieve dry mouth, suck on (sugarless) hard candy or ice chips, chew  (sugarless) gum, drink water.   Remember that your doctor has prescribed this medication because he or she has judged that the benefit to you is greater than the risk of side effects. Many people using this medication do not have serious side effects.   Contact office immediately if you have any of these unlikely but serious side effects: mental/mood changes (e.g., depression,  aggressive or violent behavior, and in rare cases, thoughts of suicide), tingling feeling in the skin, quick/severe sun sensitivity, back/joint/muscle pain, signs of infection (e.g., fever, persistent sore throat, painful swallowing, peeling skin on palms/soles.   Isotretinoin may infrequently cause disease of the pancreatitis, that may rarely be fatal. Stop taking this medication and contact office immediately if you develop: severe stomach pain severe or persistent GI upset,   Stop taking this medication and tell your doctor immediately if you develop these unlikely but very serious side effects: severe headache, vision changes, ear ringing, hearling loss, chest pain, yellowing eyes, skin, dark urine, severe diarrhea, rectal bleeding,   Seek immediate medical attention if you notice any symptoms of a serious allergic reaction.      Accutane is discussed fully with the patient. It is a very effective drug to treat acne vulgaris but has many potential significant side effects. Chief among these are teratogensis, hepatic injury, dyslipidemia and severe drying of the mucous membranes. All of these issues have been discussed in details. Monthly blood tests to monitor lipids and liver functions will be necessary. Expect painful dryness and/or fissuring around the lips, eyes, and other moist areas of the body. Balms may be protective. Contact lens may be too painful to wear temporarily while on this drug. Episodes of significant depression have been reported, including suicidal ideation and attempts in rare cases. It may also cause pseudotumor  cerebri and hyperostosis. The patient will report any such changes in mood, depressive symptoms or suicidal thoughts, headaches, joint or bone pains. There is also a possible association with inflammatory bowel disease, although this is unproven at this point.        Again, thank you for allowing me to participate in the care of your patient.        Sincerely,        Marie Arellano PA-C

## 2018-02-23 NOTE — NURSING NOTE
"Initial /71  Pulse 68  Temp 99  F (37.2  C) (Tympanic)  Resp 16  SpO2 100% Estimated body mass index is 21.47 kg/(m^2) as calculated from the following:    Height as of 8/22/17: 1.657 m (5' 5.25\").    Weight as of 12/28/17: 59 kg (130 lb). .    Annia Fu LPN    "

## 2018-02-23 NOTE — PROGRESS NOTES
Vira Salgado is a 22 year old year old female patient here today for recheck acne vulgaris. Patient has finished one month of isotretinoin at 40 mg daily. She reports she has noticed minor improvement. She notes mild dryness and denies any side effects. No mood changes.  Patient has no other skin complaints today.  Remainder of the HPI, Meds, PMH, Allergies, FH, and SH was reviewed in chart.    Pertinent Hx:   Acne Vulgaris   History reviewed. No pertinent past medical history.    Past Surgical History:   Procedure Laterality Date     NO HISTORY OF SURGERY          Family History   Problem Relation Age of Onset     Blood Disease Father      leukemia     CEREBROVASCULAR DISEASE Father      Hypertension Mother      Thyroid Disease Mother      DIABETES Paternal Grandmother      Hypertension Paternal Grandmother      Asthma No family hx of      C.A.D. No family hx of      Breast Cancer No family hx of      Cancer - colorectal No family hx of      Prostate Cancer No family hx of        Social History     Social History     Marital status: Single     Spouse name: N/A     Number of children: 0     Years of education: 12+     Occupational History      Student     Social History Main Topics     Smoking status: Never Smoker     Smokeless tobacco: Never Used     Alcohol use No     Drug use: No     Sexual activity: No     Other Topics Concern     Parent/Sibling W/ Cabg, Mi Or Angioplasty Before 65f 55m? No     Social History Narrative       Outpatient Encounter Prescriptions as of 2/23/2018   Medication Sig Dispense Refill     ISOtretinoin (ACCUTANE) 40 MG capsule Take one tablet daily in the evening. 30 capsule 0     ISOtretinoin (ACCUTANE) 20 MG capsule Take one tablet daily in the morning. 30 capsule 0     ISOtretinoin (ACCUTANE) 40 MG capsule Take 1 capsule (40 mg) by mouth daily with food ipledge # [id number] 30 capsule 0     levonorgestrel-ethinyl estradiol (AVIANE,ALESSE,LESSINA) 0.1-20 MG-MCG per tablet Take 1 tablet by  mouth daily 84 tablet 3     triamcinolone (KENALOG) 0.1 % cream Apply topically 3 times daily Apply to the back of the head 15 g 0     No facility-administered encounter medications on file as of 2/23/2018.              Review Of Systems  Skin: As above  Eyes: negative  Ears/Nose/Throat: negative  Respiratory: No shortness of breath, dyspnea on exertion, cough, or hemoptysis  Cardiovascular: negative  Gastrointestinal: negative  Genitourinary: negative  Musculoskeletal: negative  Neurologic: negative  Psychiatric: negative  Hematologic/Lymphatic/Immunologic: negative  Endocrine: negative      O:   NAD, WDWN, Alert & Oriented, Mood & Affect wnl, Vitals stable   Here today alone   /71  Pulse 68  Temp 99  F (37.2  C) (Tympanic)  Resp 16  SpO2 100%   General appearance normal   Vitals stable   Alert, oriented and in no acute distress     1+ inflammatory nodules and papules along lower cheeks, jawline and chin       Eyes: Conjunctivae/lids:Normal     ENT: Lips: normal    MSK:Normal    Pulm: Breathing Normal    Neuro/Psych: Orientation:Normal; Mood/Affect:Normal  A/P:  1. Acne vulgaris   Increase 30 mg twice daily with food.   Current Dosage: 1200 mg   Goal dosage: 7888-6958 mg   Patient is abstinent.   Standing CBC, CMP and fasting lipids  Ipledge reviewed with patient and Ipledge consent form complete  Patient place in ipledge system  Ipledge:[id number]  Return to clinic 30 days  Dry lips and mouth, minor swelling of the eyelids or lips, crusty skin, nosebleeds, GI upset, or thinning of hair may occur. If any of these effects persist or worsen, tell your doctor or pharmacist promptly.   To relieve dry mouth, suck on (sugarless) hard candy or ice chips, chew (sugarless) gum, drink water.   Remember that your doctor has prescribed this medication because he or she has judged that the benefit to you is greater than the risk of side effects. Many people using this medication do not have serious side effects.    Contact office immediately if you have any of these unlikely but serious side effects: mental/mood changes (e.g., depression,  aggressive or violent behavior, and in rare cases, thoughts of suicide), tingling feeling in the skin, quick/severe sun sensitivity, back/joint/muscle pain, signs of infection (e.g., fever, persistent sore throat, painful swallowing, peeling skin on palms/soles.   Isotretinoin may infrequently cause disease of the pancreatitis, that may rarely be fatal. Stop taking this medication and contact office immediately if you develop: severe stomach pain severe or persistent GI upset,   Stop taking this medication and tell your doctor immediately if you develop these unlikely but very serious side effects: severe headache, vision changes, ear ringing, hearling loss, chest pain, yellowing eyes, skin, dark urine, severe diarrhea, rectal bleeding,   Seek immediate medical attention if you notice any symptoms of a serious allergic reaction.      Accutane is discussed fully with the patient. It is a very effective drug to treat acne vulgaris but has many potential significant side effects. Chief among these are teratogensis, hepatic injury, dyslipidemia and severe drying of the mucous membranes. All of these issues have been discussed in details. Monthly blood tests to monitor lipids and liver functions will be necessary. Expect painful dryness and/or fissuring around the lips, eyes, and other moist areas of the body. Balms may be protective. Contact lens may be too painful to wear temporarily while on this drug. Episodes of significant depression have been reported, including suicidal ideation and attempts in rare cases. It may also cause pseudotumor cerebri and hyperostosis. The patient will report any such changes in mood, depressive symptoms or suicidal thoughts, headaches, joint or bone pains. There is also a possible association with inflammatory bowel disease, although this is unproven at this  point.

## 2018-02-23 NOTE — MR AVS SNAPSHOT
After Visit Summary   2/23/2018    Vira Salgado    MRN: [id number]           Patient Information     Date Of Birth          1995        Visit Information        Provider Department      2/23/2018 10:00 AM Marie Larson PA-C [facility]        Today's Diagnoses     Acne vulgaris    -  1       Follow-ups after your visit        Your next 10 appointments already scheduled     Mar 23, 2018  7:40 AM CDT   MyChart Dermatology Return with Marie Larson PA-C   [facility] ([facility])    [address] [facility] [address]   [phone]            Apr 20, 2018  8:40 AM CDT   MyChart Dermatology Return with Marie Larson PA-C   [facility] ([facility])    [address] [facility] [address]   [phone]            May 25, 2018  7:40 AM CDT   MyChart Dermatology Return with Marie Larson PA-C   [facility] ([facility])    [address] [address]   [phone]              Who to contact     If you have questions or need follow up information about today's clinic visit or your schedule please contact [facility] directly at [phone].  Normal or non-critical lab and imaging results will be communicated to you by MyChart, letter or phone within 4 business days after the clinic has received the results. If you do not hear from us within 7 days, please contact the clinic through MyChart or phone. If you have a critical or abnormal lab result, we will notify you by phone as soon as possible.  Submit refill requests through RFinity or call your pharmacy and they will forward the refill request to us. Please allow 3 business days for your refill to be completed.          Additional Information About Your Visit        [employer] Information     RFinity gives you secure access to your electronic health record. If you see a  primary care provider, you can also send messages to your care team and make appointments. If you have questions, please call your primary care clinic.  If you do not have a primary care provider, please call [phone] and they will assist you.        Care EveryWhere ID     This is your Care EveryWhere ID. This could be used by other organizations to access your [facility] medical records  [id number]        Your Vitals Were     Pulse Temperature Respirations Pulse Oximetry          68 99  F (37.2  C) (Tympanic) 16 100%         Blood Pressure from Last 3 Encounters:   02/23/18 122/71   01/26/18 118/69   12/28/17 130/77    Weight from Last 3 Encounters:   12/28/17 59 kg (130 lb)   08/22/17 61.7 kg (136 lb)   08/18/16 67.8 kg (149 lb 8 oz)              Today, you had the following     No orders found for display         Today's Medication Changes          These changes are accurate as of 2/23/18 12:12 PM.  If you have any questions, ask your nurse or doctor.               These medicines have changed or have updated prescriptions.        Dose/Directions    * ISOtretinoin 40 MG capsule   Commonly known as:  ACCUTANE   This may have changed:  Another medication with the same name was added. Make sure you understand how and when to take each.   Used for:  Acne vulgaris   Changed by:  Marie Larson PA-C        Dose:  40 mg   Take 1 capsule (40 mg) by mouth daily with food ipledge # [id number]   Quantity:  30 capsule   Refills:  0       * ISOtretinoin 40 MG capsule   Commonly known as:  ACCUTANE   This may have changed:  You were already taking a medication with the same name, and this prescription was added. Make sure you understand how and when to take each.   Used for:  Acne vulgaris   Changed by:  Marie Larson PA-C        Take one tablet daily in the evening.   Quantity:  30 capsule   Refills:  0       * ISOtretinoin 20 MG capsule   Commonly known as:  ACCUTANE   This may have changed:  You were  already taking a medication with the same name, and this prescription was added. Make sure you understand how and when to take each.   Used for:  Acne vulgaris   Changed by:  Marie Larson PA-C        Take one tablet daily in the morning.   Quantity:  30 capsule   Refills:  0       * Notice:  This list has 3 medication(s) that are the same as other medications prescribed for you. Read the directions carefully, and ask your doctor or other care provider to review them with you.         Where to get your medicines      These medications were sent to [facility] [address] [place] - [address]  [address] [place] [address]     Phone:  [phone]     ISOtretinoin 20 MG capsule    ISOtretinoin 40 MG capsule                Primary Care Provider Office Phone # Fax #    Tere Griggs PA-C [phone] [address] [place] [address]        Equal Access to Services     ELENA SNOW AH: Hadii aad ku hadasho Soomaali, waaxda luqadaha, qaybta kaalmada adeegyada, waxay idiin haykimbern carolina marks . So [facility] [phone].    ATENCIÓN: Si habla español, tiene a white disposición servicios gratuitos de asistencia lingüística. Llame al [phone].    We comply with applicable federal civil rights laws and [place] laws. We do not discriminate on the basis of race, color, national origin, age, disability, sex, sexual orientation, or gender identity.            Thank you!     Thank you for choosing [facility]  for your care. Our goal is always to provide you with excellent care. Hearing back from our patients is one way we can continue to improve our services. Please take a few minutes to complete the written survey that you may receive in the mail after your visit with us. Thank you!             Your Updated Medication List - Protect others around you: Learn how to safely use, store and throw away your medicines at  www.disposemymeds.org.          This list is accurate as of 2/23/18 12:12 PM.  Always use your most recent med list.                   Brand Name Dispense Instructions for use Diagnosis    * ISOtretinoin 40 MG capsule    ACCUTANE    30 capsule    Take 1 capsule (40 mg) by mouth daily with food ipledge # [id number]    Acne vulgaris       * ISOtretinoin 40 MG capsule    ACCUTANE    30 capsule    Take one tablet daily in the evening.    Acne vulgaris       * ISOtretinoin 20 MG capsule    ACCUTANE    30 capsule    Take one tablet daily in the morning.    Acne vulgaris       levonorgestrel-ethinyl estradiol 0.1-20 MG-MCG per tablet    AVIANE,ALESSE,LESSINA    84 tablet    Take 1 tablet by mouth daily    Encounter for initial prescription of contraceptive pills       triamcinolone 0.1 % cream    KENALOG    15 g    Apply topically 3 times daily Apply to the back of the head    Eczema       * Notice:  This list has 3 medication(s) that are the same as other medications prescribed for you. Read the directions carefully, and ask your doctor or other care provider to review them with you.

## 2018-03-23 ENCOUNTER — OFFICE VISIT (OUTPATIENT)
Dept: DERMATOLOGY | Facility: CLINIC | Age: 23
End: 2018-03-23
Payer: COMMERCIAL

## 2018-03-23 VITALS — SYSTOLIC BLOOD PRESSURE: 119 MMHG | DIASTOLIC BLOOD PRESSURE: 72 MMHG | OXYGEN SATURATION: 100 % | HEART RATE: 89 BPM

## 2018-03-23 DIAGNOSIS — L70.0 ACNE VULGARIS: ICD-10-CM

## 2018-03-23 LAB
ALBUMIN SERPL-MCNC: 3.7 G/DL (ref 3.4–5)
ALP SERPL-CCNC: 65 U/L (ref 40–150)
ALT SERPL W P-5'-P-CCNC: 18 U/L (ref 0–50)
ANION GAP SERPL CALCULATED.3IONS-SCNC: 5 MMOL/L (ref 3–14)
AST SERPL W P-5'-P-CCNC: 19 U/L (ref 0–45)
BETA HCG QUAL IFA URINE: NEGATIVE
BILIRUB SERPL-MCNC: 0.9 MG/DL (ref 0.2–1.3)
BUN SERPL-MCNC: 12 MG/DL (ref 7–30)
CALCIUM SERPL-MCNC: 9 MG/DL (ref 8.5–10.1)
CHLORIDE SERPL-SCNC: 105 MMOL/L (ref 94–109)
CHOLEST SERPL-MCNC: 192 MG/DL
CO2 SERPL-SCNC: 28 MMOL/L (ref 20–32)
CREAT SERPL-MCNC: 0.82 MG/DL (ref 0.52–1.04)
ERYTHROCYTE [DISTWIDTH] IN BLOOD BY AUTOMATED COUNT: 12.5 % (ref 10–15)
GFR SERPL CREATININE-BSD FRML MDRD: 85 ML/MIN/1.7M2
GLUCOSE SERPL-MCNC: 119 MG/DL (ref 70–99)
HCT VFR BLD AUTO: 40.7 % (ref 35–47)
HDLC SERPL-MCNC: 43 MG/DL
HGB BLD-MCNC: 13.4 G/DL (ref 11.7–15.7)
LDLC SERPL CALC-MCNC: 129 MG/DL
MCH RBC QN AUTO: 29.5 PG (ref 26.5–33)
MCHC RBC AUTO-ENTMCNC: 32.9 G/DL (ref 31.5–36.5)
MCV RBC AUTO: 90 FL (ref 78–100)
NONHDLC SERPL-MCNC: 149 MG/DL
PLATELET # BLD AUTO: 215 10E9/L (ref 150–450)
POTASSIUM SERPL-SCNC: 4.8 MMOL/L (ref 3.4–5.3)
PROT SERPL-MCNC: 7.6 G/DL (ref 6.8–8.8)
RBC # BLD AUTO: 4.55 10E12/L (ref 3.8–5.2)
SODIUM SERPL-SCNC: 138 MMOL/L (ref 133–144)
TRIGL SERPL-MCNC: 100 MG/DL
WBC # BLD AUTO: 9.7 10E9/L (ref 4–11)

## 2018-03-23 PROCEDURE — 84703 CHORIONIC GONADOTROPIN ASSAY: CPT | Performed by: PHYSICIAN ASSISTANT

## 2018-03-23 PROCEDURE — 36415 COLL VENOUS BLD VENIPUNCTURE: CPT | Performed by: PHYSICIAN ASSISTANT

## 2018-03-23 PROCEDURE — 99213 OFFICE O/P EST LOW 20 MIN: CPT | Performed by: PHYSICIAN ASSISTANT

## 2018-03-23 PROCEDURE — 80053 COMPREHEN METABOLIC PANEL: CPT | Performed by: PHYSICIAN ASSISTANT

## 2018-03-23 PROCEDURE — 85027 COMPLETE CBC AUTOMATED: CPT | Performed by: PHYSICIAN ASSISTANT

## 2018-03-23 PROCEDURE — 80061 LIPID PANEL: CPT | Performed by: PHYSICIAN ASSISTANT

## 2018-03-23 RX ORDER — ISOTRETINOIN 40 MG/1
CAPSULE ORAL
Qty: 30 CAPSULE | Refills: 0 | Status: SHIPPED | OUTPATIENT
Start: 2018-03-23 | End: 2018-04-20

## 2018-03-23 RX ORDER — ISOTRETINOIN 20 MG/1
CAPSULE ORAL
Qty: 30 CAPSULE | Refills: 0 | Status: SHIPPED | OUTPATIENT
Start: 2018-03-23 | End: 2018-04-20

## 2018-03-23 NOTE — NURSING NOTE
"Initial /72  Pulse 89  SpO2 100% Estimated body mass index is 21.47 kg/(m^2) as calculated from the following:    Height as of 8/22/17: 1.657 m (5' 5.25\").    Weight as of 12/28/17: 59 kg (130 lb). .      "

## 2018-03-23 NOTE — MR AVS SNAPSHOT
After Visit Summary   3/23/2018    Vira Salgado    MRN: [id number]           Patient Information     Date Of Birth          1995        Visit Information        Provider Department      3/23/2018 7:40 AM Marie Larson PA-C [facility]        Today's Diagnoses     Acne vulgaris           Follow-ups after your visit        Your next 10 appointments already scheduled     Apr 20, 2018  8:40 AM CDT   MyChart Dermatology Return with Marie Larson PA-C   [facility]    [address] [facility] [address]   [phone]            May 25, 2018  7:40 AM CDT   MyChart Dermatology Return with Marie Larson PA-C   [facility] ([facility])    [address] [facility] [address]   [phone]              Who to contact     If you have questions or need follow up information about today's clinic visit or your schedule please contact [facility] directly at [phone].  Normal or non-critical lab and imaging results will be communicated to you by Bring[employer]hart, letter or phone within 4 business days after the clinic has received the results. If you do not hear from us within 7 days, please contact the clinic through [employer] or phone. If you have a critical or abnormal lab result, we will notify you by phone as soon as possible.  Submit refill requests through [email or web address] or call your pharmacy and they will forward the refill request to us. Please allow 3 business days for your refill to be completed.          Additional Information About Your Visit        [employer] Information     [employer] gives you secure access to your electronic health record. If you see a primary care provider, you can also send messages to your care team and make appointments. If you have questions, please call your primary care clinic.  If you do not have a primary care provider, please call [phone] and  they will assist you.        Care EveryWhere ID     This is your Care EveryWhere ID. This could be used by other organizations to access your [facility] medical records  [id number]        Your Vitals Were     Pulse Pulse Oximetry                89 100%           Blood Pressure from Last 3 Encounters:   03/23/18 119/72   02/23/18 122/71   01/26/18 118/69    Weight from Last 3 Encounters:   12/28/17 59 kg (130 lb)   08/22/17 61.7 kg (136 lb)   08/18/16 67.8 kg (149 lb 8 oz)              We Performed the Following     Beta HCG qual IFA urine     CBC with platelets     Comprehensive metabolic panel     Lipid panel reflex to direct LDL Fasting          Where to get your medicines      [id number]  [place], [place] - [address] [place] [address] [place], [place]  [address] [facility] [address], [place] [address]     Phone:  [phone]     ISOtretinoin 20 MG capsule    ISOtretinoin 40 MG capsule          Primary Care Provider Office Phone # Fax #    Tere Griggs PA-C [phone] [address] [address]        Equal Access to Services     ELENA SNOW AH: Hadii aad ku hadasho Soomaali, waaxda luqadaha, qaybta kaalmada adeegyada, darlene verduzcon carolina campbell. So [facility] [phone].    ATENCIÓN: Si habla español, tiene a white disposición servicios gratuitos de asistencia lingüística. Zoraname al [phone].    We comply with applicable federal civil rights laws and [place] laws. We do not discriminate on the basis of race, color, national origin, age, disability, sex, sexual orientation, or gender identity.            Thank you!     Thank you for choosing [facility]  for your care. Our goal is always to provide you with excellent care. Hearing back from our patients is one way we can continue to improve our services. Please take a few minutes to complete the written survey that you may receive in the mail after your visit with us. Thank you!              Your Updated Medication List - Protect others around you: Learn how to safely use, store and throw away your medicines at www.disposemymeds.org.          This list is accurate as of 3/23/18  7:56 AM.  Always use your most recent med list.                   Brand Name Dispense Instructions for use Diagnosis    * ISOtretinoin 40 MG capsule    ACCUTANE    30 capsule    Take 1 capsule (40 mg) by mouth daily with food ipledge # [id number]    Acne vulgaris       * ISOtretinoin 40 MG capsule    ACCUTANE    30 capsule    Take one tablet daily in the evening.    Acne vulgaris       * ISOtretinoin 20 MG capsule    ACCUTANE    30 capsule    Take one tablet daily in the morning.    Acne vulgaris       levonorgestrel-ethinyl estradiol 0.1-20 MG-MCG per tablet    AVIANE,ALESSE,LESSINA    84 tablet    Take 1 tablet by mouth daily    Encounter for initial prescription of contraceptive pills       triamcinolone 0.1 % cream    KENALOG    15 g    Apply topically 3 times daily Apply to the back of the head    Eczema       * Notice:  This list has 3 medication(s) that are the same as other medications prescribed for you. Read the directions carefully, and ask your doctor or other care provider to review them with you.

## 2018-03-23 NOTE — PROGRESS NOTES
Vira Salgado is a 22 year old year old female patient here today for recheck acne vulgaris.  Patient has finished second month of isotretinoin at 60 mg daily. She reports she has noticed minor improvement. She notes more dryness to eyes, lips, and face and denies any side effects. No mood changes.    Patient has no other skin complaints today.  Remainder of the HPI, Meds, PMH, Allergies, FH, and SH was reviewed in chart.    Pertinent Hx:  Acne Vulgaris   History reviewed. No pertinent past medical history.    Past Surgical History:   Procedure Laterality Date     NO HISTORY OF SURGERY          Family History   Problem Relation Age of Onset     Blood Disease Father      leukemia     CEREBROVASCULAR DISEASE Father      Hypertension Mother      Thyroid Disease Mother      DIABETES Paternal Grandmother      Hypertension Paternal Grandmother      Asthma No family hx of      C.A.D. No family hx of      Breast Cancer No family hx of      Cancer - colorectal No family hx of      Prostate Cancer No family hx of        Social History     Social History     Marital status: Single     Spouse name: N/A     Number of children: 0     Years of education: 12+     Occupational History      Student     Social History Main Topics     Smoking status: Never Smoker     Smokeless tobacco: Never Used     Alcohol use No     Drug use: No     Sexual activity: No     Other Topics Concern     Parent/Sibling W/ Cabg, Mi Or Angioplasty Before 65f 55m? No     Social History Narrative       Outpatient Encounter Prescriptions as of 3/23/2018   Medication Sig Dispense Refill     ISOtretinoin (ACCUTANE) 40 MG capsule Take one tablet daily in the evening. 30 capsule 0     ISOtretinoin (ACCUTANE) 20 MG capsule Take one tablet daily in the morning. 30 capsule 0     ISOtretinoin (ACCUTANE) 40 MG capsule Take 1 capsule (40 mg) by mouth daily with food ipledge # [id number] 30 capsule 0     levonorgestrel-ethinyl estradiol (AVIANE,ALESSE,LESSINA) 0.1-20 MG-MCG  per tablet Take 1 tablet by mouth daily 84 tablet 3     triamcinolone (KENALOG) 0.1 % cream Apply topically 3 times daily Apply to the back of the head 15 g 0     [DISCONTINUED] ISOtretinoin (ACCUTANE) 40 MG capsule Take one tablet daily in the evening. 30 capsule 0     [DISCONTINUED] ISOtretinoin (ACCUTANE) 20 MG capsule Take one tablet daily in the morning. 30 capsule 0     No facility-administered encounter medications on file as of 3/23/2018.              Review Of Systems  Skin: As above  Eyes: negative  Ears/Nose/Throat: negative  Respiratory: No shortness of breath, dyspnea on exertion, cough, or hemoptysis  Cardiovascular: negative  Gastrointestinal: negative  Genitourinary: negative  Musculoskeletal: negative  Neurologic: negative  Psychiatric: negative  Hematologic/Lymphatic/Immunologic: negative  Endocrine: negative      O:   NAD, WDWN, Alert & Oriented, Mood & Affect wnl, Vitals stable   Here today alone   /72  Pulse 89  SpO2 100%   General appearance normal   Vitals stable   Alert, oriented and in no acute distress     1+ inflammatory papules on face       Eyes: Conjunctivae/lids:Normal     ENT: Lips: normal    MSK:Normal    Pulm: Breathing Normal    Neuro/Psych: Orientation:Normal; Mood/Affect:Normal  A/P:  1. Acne vulgaris   Continue 60 mg daily with food.   Current Dosage: 3000 mg   Goal dosage: 0604-9930 mg   Patient is abstinent.   Standing CBC, CMP and fasting lipids  Ipledge reviewed with patient and Ipledge consent form complete  Patient place in ipledge system  Ipledge:[id number]  Return to clinic 30 days  Dry lips and mouth, minor swelling of the eyelids or lips, crusty skin, nosebleeds, GI upset, or thinning of hair may occur. If any of these effects persist or worsen, tell your doctor or pharmacist promptly.   To relieve dry mouth, suck on (sugarless) hard candy or ice chips, chew (sugarless) gum, drink water.   Remember that your doctor has prescribed this medication because he or  she has judged that the benefit to you is greater than the risk of side effects. Many people using this medication do not have serious side effects.   Contact office immediately if you have any of these unlikely but serious side effects: mental/mood changes (e.g., depression,  aggressive or violent behavior, and in rare cases, thoughts of suicide), tingling feeling in the skin, quick/severe sun sensitivity, back/joint/muscle pain, signs of infection (e.g., fever, persistent sore throat, painful swallowing, peeling skin on palms/soles.   Isotretinoin may infrequently cause disease of the pancreatitis, that may rarely be fatal. Stop taking this medication and contact office immediately if you develop: severe stomach pain severe or persistent GI upset,   Stop taking this medication and tell your doctor immediately if you develop these unlikely but very serious side effects: severe headache, vision changes, ear ringing, hearling loss, chest pain, yellowing eyes, skin, dark urine, severe diarrhea, rectal bleeding,   Seek immediate medical attention if you notice any symptoms of a serious allergic reaction.      Accutane is discussed fully with the patient. It is a very effective drug to treat acne vulgaris but has many potential significant side effects. Chief among these are teratogensis, hepatic injury, dyslipidemia and severe drying of the mucous membranes. All of these issues have been discussed in details. Monthly blood tests to monitor lipids and liver functions will be necessary. Expect painful dryness and/or fissuring around the lips, eyes, and other moist areas of the body. Balms may be protective. Contact lens may be too painful to wear temporarily while on this drug. Episodes of significant depression have been reported, including suicidal ideation and attempts in rare cases. It may also cause pseudotumor cerebri and hyperostosis. The patient will report any such changes in mood, depressive symptoms or  suicidal thoughts, headaches, joint or bone pains. There is also a possible association with inflammatory bowel disease, although this is unproven at this point.

## 2018-03-23 NOTE — LETTER
3/23/2018         RE: Vira Salgado  [address] [address]        Dear Colleague,    Thank you for referring your patient, Vira Salgado, to the [facility]. Please see a copy of my visit note below.    Vira Salgado is a 22 year old year old female patient here today for recheck acne vulgaris.  Patient has finished second month of isotretinoin at 60 mg daily. She reports she has noticed minor improvement. She notes more dryness to eyes, lips, and face and denies any side effects. No mood changes.     Patient has no other skin complaints today.  Remainder of the HPI, Meds, PMH, Allergies, FH, and SH was reviewed in chart.    Pertinent Hx:  Acne Vulgaris   History reviewed. No pertinent past medical history.    Past Surgical History:   Procedure Laterality Date     NO HISTORY OF SURGERY          Family History   Problem Relation Age of Onset     Blood Disease Father      leukemia     CEREBROVASCULAR DISEASE Father      Hypertension Mother      Thyroid Disease Mother      DIABETES Paternal Grandmother      Hypertension Paternal Grandmother      Asthma No family hx of      C.A.D. No family hx of      Breast Cancer No family hx of      Cancer - colorectal No family hx of      Prostate Cancer No family hx of        Social History     Social History     Marital status: Single     Spouse name: N/A     Number of children: 0     Years of education: 12+     Occupational History      Student     Social History Main Topics     Smoking status: Never Smoker     Smokeless tobacco: Never Used     Alcohol use No     Drug use: No     Sexual activity: No     Other Topics Concern     Parent/Sibling W/ Cabg, Mi Or Angioplasty Before 65f 55m? No     Social History Narrative       Outpatient Encounter Prescriptions as of 3/23/2018   Medication Sig Dispense Refill     ISOtretinoin (ACCUTANE) 40 MG capsule Take one tablet daily in the evening. 30 capsule 0     ISOtretinoin (ACCUTANE) 20 MG capsule  Take one tablet daily in the morning. 30 capsule 0     ISOtretinoin (ACCUTANE) 40 MG capsule Take 1 capsule (40 mg) by mouth daily with food ipledge # [id number] 30 capsule 0     levonorgestrel-ethinyl estradiol (AVIANE,ALESSE,LESSINA) 0.1-20 MG-MCG per tablet Take 1 tablet by mouth daily 84 tablet 3     triamcinolone (KENALOG) 0.1 % cream Apply topically 3 times daily Apply to the back of the head 15 g 0     [DISCONTINUED] ISOtretinoin (ACCUTANE) 40 MG capsule Take one tablet daily in the evening. 30 capsule 0     [DISCONTINUED] ISOtretinoin (ACCUTANE) 20 MG capsule Take one tablet daily in the morning. 30 capsule 0     No facility-administered encounter medications on file as of 3/23/2018.              Review Of Systems  Skin: As above  Eyes: negative  Ears/Nose/Throat: negative  Respiratory: No shortness of breath, dyspnea on exertion, cough, or hemoptysis  Cardiovascular: negative  Gastrointestinal: negative  Genitourinary: negative  Musculoskeletal: negative  Neurologic: negative  Psychiatric: negative  Hematologic/Lymphatic/Immunologic: negative  Endocrine: negative      O:   NAD, WDWN, Alert & Oriented, Mood & Affect wnl, Vitals stable   Here today alone   /72  Pulse 89  SpO2 100%   General appearance normal   Vitals stable   Alert, oriented and in no acute distress     1+ inflammatory papules on face       Eyes: Conjunctivae/lids:Normal     ENT: Lips: normal    MSK:Normal    Pulm: Breathing Normal    Neuro/Psych: Orientation:Normal; Mood/Affect:Normal  A/P:  1. Acne vulgaris   Continue 60 mg daily with food.   Current Dosage: 3000 mg   Goal dosage: 1112-0239 mg   Patient is abstinent.   Standing CBC, CMP and fasting lipids  Ipledge reviewed with patient and Ipledge consent form complete  Patient place in ipledge system  Ipledge:[id number]  Return to clinic 30 days  Dry lips and mouth, minor swelling of the eyelids or lips, crusty skin, nosebleeds, GI upset, or thinning of hair may occur. If any of  these effects persist or worsen, tell your doctor or pharmacist promptly.   To relieve dry mouth, suck on (sugarless) hard candy or ice chips, chew (sugarless) gum, drink water.   Remember that your doctor has prescribed this medication because he or she has judged that the benefit to you is greater than the risk of side effects. Many people using this medication do not have serious side effects.   Contact office immediately if you have any of these unlikely but serious side effects: mental/mood changes (e.g., depression,  aggressive or violent behavior, and in rare cases, thoughts of suicide), tingling feeling in the skin, quick/severe sun sensitivity, back/joint/muscle pain, signs of infection (e.g., fever, persistent sore throat, painful swallowing, peeling skin on palms/soles.   Isotretinoin may infrequently cause disease of the pancreatitis, that may rarely be fatal. Stop taking this medication and contact office immediately if you develop: severe stomach pain severe or persistent GI upset,   Stop taking this medication and tell your doctor immediately if you develop these unlikely but very serious side effects: severe headache, vision changes, ear ringing, hearling loss, chest pain, yellowing eyes, skin, dark urine, severe diarrhea, rectal bleeding,   Seek immediate medical attention if you notice any symptoms of a serious allergic reaction.      Accutane is discussed fully with the patient. It is a very effective drug to treat acne vulgaris but has many potential significant side effects. Chief among these are teratogensis, hepatic injury, dyslipidemia and severe drying of the mucous membranes. All of these issues have been discussed in details. Monthly blood tests to monitor lipids and liver functions will be necessary. Expect painful dryness and/or fissuring around the lips, eyes, and other moist areas of the body. Balms may be protective. Contact lens may be too painful to wear temporarily while on this  drug. Episodes of significant depression have been reported, including suicidal ideation and attempts in rare cases. It may also cause pseudotumor cerebri and hyperostosis. The patient will report any such changes in mood, depressive symptoms or suicidal thoughts, headaches, joint or bone pains. There is also a possible association with inflammatory bowel disease, although this is unproven at this point.        Again, thank you for allowing me to participate in the care of your patient.        Sincerely,        Marie Arellano PA-C

## 2018-04-05 ENCOUNTER — OFFICE VISIT (OUTPATIENT)
Dept: FAMILY MEDICINE | Facility: CLINIC | Age: 23
End: 2018-04-05
Payer: COMMERCIAL

## 2018-04-05 VITALS
HEART RATE: 96 BPM | DIASTOLIC BLOOD PRESSURE: 76 MMHG | HEIGHT: 66 IN | WEIGHT: 129.6 LBS | TEMPERATURE: 99.1 F | BODY MASS INDEX: 20.83 KG/M2 | SYSTOLIC BLOOD PRESSURE: 122 MMHG

## 2018-04-05 DIAGNOSIS — H10.33 ACUTE BACTERIAL CONJUNCTIVITIS OF BOTH EYES: Primary | ICD-10-CM

## 2018-04-05 PROCEDURE — 99213 OFFICE O/P EST LOW 20 MIN: CPT | Performed by: NURSE PRACTITIONER

## 2018-04-05 RX ORDER — TOBRAMYCIN AND DEXAMETHASONE 3; 1 MG/ML; MG/ML
SUSPENSION/ DROPS OPHTHALMIC
Qty: 1 BOTTLE | Refills: 1 | Status: SHIPPED | OUTPATIENT
Start: 2018-04-05 | End: 2018-05-24

## 2018-04-05 ASSESSMENT — ENCOUNTER SYMPTOMS
COUGH: 0
PHOTOPHOBIA: 1
FATIGUE: 0
EYE DISCHARGE: 1
SHORTNESS OF BREATH: 0
EYE PAIN: 1
CHILLS: 0
RHINORRHEA: 0
SINUS PRESSURE: 0
EYE REDNESS: 1
PALPITATIONS: 0
FEVER: 0
EYE ITCHING: 0
SINUS PAIN: 0
WHEEZING: 0

## 2018-04-05 ASSESSMENT — PAIN SCALES - GENERAL: PAINLEVEL: SEVERE PAIN (7)

## 2018-04-05 NOTE — PROGRESS NOTES
SUBJECTIVE:   Vira Salgado is a 22 year old female who presents to clinic today for the following health issues:      Eye(s) Problem      Duration: 1 week    Description:  Location: bilateral  Pain: YES  Redness: YES  Discharge: YES    Accompanying signs and symptoms: Is experiencing sensitivity to light.    History (Trauma, foreign body exposure,): None    Precipitating or alleviating factors (contact use): None    Therapies tried and outcome: Eye drops, OTC allergy medication      Problem list and histories reviewed & adjusted, as indicated.  Additional history: as documented    Current Outpatient Prescriptions   Medication Sig Dispense Refill     tobramycin-dexamethasone (TOBRADEX) 0.3-0.1 % ophthalmic susp Apply 1 drop in both eye three times per day. Use for 2 days after the redness is gone. Typically 5-7 days total. 1 Bottle 1     ISOtretinoin (ACCUTANE) 40 MG capsule Take one tablet daily in the evening. 30 capsule 0     ISOtretinoin (ACCUTANE) 20 MG capsule Take one tablet daily in the morning. 30 capsule 0     levonorgestrel-ethinyl estradiol (AVIANE,ALESSE,LESSINA) 0.1-20 MG-MCG per tablet Take 1 tablet by mouth daily 84 tablet 3     triamcinolone (KENALOG) 0.1 % cream Apply topically 3 times daily Apply to the back of the head 15 g 0     Allergies   Allergen Reactions     Penicillins Hives       Reviewed and updated as needed this visit by clinical staff  Tobacco  Allergies  Meds  Med Hx  Surg Hx  Fam Hx  Soc Hx      Reviewed and updated as needed this visit by Provider          For the last week has been having redness and swelling to both eyes. Seems to go from 1 eye to another. Has a lot of drainage. Is having pain to eyes. No fevers or chills. Eyes are sensitive to light and vision is harder to focus. Does not wear contacts. Eye drops are not helping.     ROS:  Review of Systems   Constitutional: Negative for chills, fatigue and fever.   HENT: Negative for congestion, ear discharge, ear pain,  "postnasal drip, rhinorrhea, sinus pain, sinus pressure and sneezing.    Eyes: Positive for photophobia, pain, discharge, redness and visual disturbance. Negative for itching.   Respiratory: Negative for cough, shortness of breath and wheezing.    Cardiovascular: Negative for chest pain and palpitations.   Skin: Negative.    Allergic/Immunologic: Negative for environmental allergies and food allergies.         OBJECTIVE:     /76  Pulse 96  Temp 99.1  F (37.3  C) (Tympanic)  Ht 5' 5.5\" (1.664 m)  Wt 129 lb 9.6 oz (58.8 kg)  LMP 03/05/2018 (Approximate)  BMI 21.24 kg/m2  Body mass index is 21.24 kg/(m^2).  Physical Exam   Constitutional: She appears well-developed and well-nourished. She is cooperative. No distress.   HENT:   Head: Normocephalic and atraumatic.   Right Ear: Hearing, tympanic membrane, external ear and ear canal normal.   Left Ear: Hearing, tympanic membrane, external ear and ear canal normal.   Nose: Nose normal.   Mouth/Throat: Uvula is midline, oropharynx is clear and moist and mucous membranes are normal. No oropharyngeal exudate, posterior oropharyngeal edema or posterior oropharyngeal erythema.   Eyes: EOM are normal. Pupils are equal, round, and reactive to light. Right eye exhibits discharge (purulent) and exudate. Right eye exhibits no chemosis. Left eye exhibits discharge (purulent) and exudate. Left eye exhibits no chemosis. Right conjunctiva is injected. Left conjunctiva is injected.   Cardiovascular: Normal rate, regular rhythm, S1 normal, S2 normal and normal heart sounds.  Exam reveals no gallop and no friction rub.    No murmur heard.  Pulmonary/Chest: Effort normal and breath sounds normal. No respiratory distress. She has no wheezes. She has no rales.   Neurological: She is alert.   Skin: Skin is warm and dry.   Vitals reviewed.          ASSESSMENT/PLAN:     1. Acute bacterial conjunctivitis of both eyes  Start tobradex eye drops today.  Educated patient that she is " contagious for the next 24 hours. Advised to wash hands frequently, use different towels then others in the house.  If symptoms do not improve after 1 week please return to the clinic.    - tobramycin-dexamethasone (TOBRADEX) 0.3-0.1 % ophthalmic susp; Apply 1 drop in both eye three times per day. Use for 2 days after the redness is gone. Typically 5-7 days total.  Dispense: 1 Bottle; Refill: 1    Marie Foote RN [employer] Student Nurse Practitioner     I agree with the PFSH and ROS as completed by the NP student. The remainder of the encounter was performed by me and scribed the NP student. The scribed note accurately reflects my personal services and the decisions made by me. GALO Castro, NP-C    GALO Snyder [facility]

## 2018-04-05 NOTE — PATIENT INSTRUCTIONS
These are general instructions and may not be specific to you. Please call, email or follow up if you have any questions or concerns.     Conjunctivitis, Bacterial    You have an infection in the membranes covering the white part of the eye. This part of the eye is called the conjunctiva. The infection is called conjunctivitis. The most common symptoms of conjunctivitis include a thick, pus-like discharge from the eye, swollen eyelids, redness, eyelids sticking together upon awakening, and a gritty or scratchy feeling in the eye. Your infection was caused by bacteria. It may be treated with medicine. With treatment, the infection takes about 7 to 10 days to resolve.  Home care    Use prescribed antibiotic eye drops or ointment as directed to treat the infection.    Apply a warm compress (towel soaked in warm water) to the affected eye 3 to 4 times a day. Do this just before applying medicine to the eye.    Use a warm, wet cloth to wipe away crusting of the eyelids in the morning. This is caused by mucus drainage during the night. You may also use saline irrigating solution or artificial tears to rinse away mucus in the eye. Do not put a patch over the eye.    Wash your hands before and after touching the infected eye. This is to prevent spreading the infection to the other eye, and to other people. Don't share your towels or washcloths with others.    You may use acetaminophen or ibuprofen to control pain, unless another medicine was prescribed. (Note: If you have chronic liver or kidney disease or have ever had a stomach ulcer or gastrointestinal bleeding, talk with your doctor before using these medicines.)    Don't wear contact lenses until your eyes have healed and all symptoms are gone.  Follow-up care  Follow up with your healthcare provider, or as advised.  When to seek medical advice  Call your healthcare provider right away if any of these occur:    Worsening vision    Increasing pain in the  eye    Increasing swelling or redness of the eyelid    Redness spreading around the eye  Date Last Reviewed: 6/14/2015 2000-2017 [employer]. [address] [place] [address], [place] [address]. All rights reserved. This information is not intended as a substitute for professional medical care. Always follow your healthcare professional's instructions.

## 2018-04-05 NOTE — NURSING NOTE
"Chief Complaint   Patient presents with     Conjunctivitis       Initial /76  Pulse 96  Temp 99.1  F (37.3  C) (Tympanic)  Ht 5' 5.5\" (1.664 m)  Wt 129 lb 9.6 oz (58.8 kg)  LMP 03/05/2018 (Approximate)  BMI 21.24 kg/m2 Estimated body mass index is 21.24 kg/(m^2) as calculated from the following:    Height as of this encounter: 5' 5.5\" (1.664 m).    Weight as of this encounter: 129 lb 9.6 oz (58.8 kg).  Medication Reconciliation: complete     Elba Baugh CMA(AMAA)      "

## 2018-04-05 NOTE — MR AVS SNAPSHOT
After Visit Summary   4/5/2018    Vira Salgado    MRN: [id number]           Patient Information     Date Of Birth          1995        Visit Information        Provider Department      4/5/2018 8:30 AM Zenia Lay APRN [facility]        Today's Diagnoses     Acute bacterial conjunctivitis of both eyes    -  1      Care Instructions    These are general instructions and may not be specific to you. Please call, email or follow up if you have any questions or concerns.     Conjunctivitis, Bacterial    You have an infection in the membranes covering the white part of the eye. This part of the eye is called the conjunctiva. The infection is called conjunctivitis. The most common symptoms of conjunctivitis include a thick, pus-like discharge from the eye, swollen eyelids, redness, eyelids sticking together upon awakening, and a gritty or scratchy feeling in the eye. Your infection was caused by bacteria. It may be treated with medicine. With treatment, the infection takes about 7 to 10 days to resolve.  Home care    Use prescribed antibiotic eye drops or ointment as directed to treat the infection.    Apply a warm compress (towel soaked in warm water) to the affected eye 3 to 4 times a day. Do this just before applying medicine to the eye.    Use a warm, wet cloth to wipe away crusting of the eyelids in the morning. This is caused by mucus drainage during the night. You may also use saline irrigating solution or artificial tears to rinse away mucus in the eye. Do not put a patch over the eye.    Wash your hands before and after touching the infected eye. This is to prevent spreading the infection to the other eye, and to other people. Don't share your towels or washcloths with others.    You may use acetaminophen or ibuprofen to control pain, unless another medicine was prescribed. (Note: If you have chronic liver or kidney disease or have ever had a stomach ulcer or  gastrointestinal bleeding, talk with your doctor before using these medicines.)    Don't wear contact lenses until your eyes have healed and all symptoms are gone.  Follow-up care  Follow up with your healthcare provider, or as advised.  When to seek medical advice  Call your healthcare provider right away if any of these occur:    Worsening vision    Increasing pain in the eye    Increasing swelling or redness of the eyelid    Redness spreading around the eye  Date Last Reviewed: 6/14/2015 2000-2017 [employer]. [address] [place] [address]. All rights reserved. This information is not intended as a substitute for professional medical care. Always follow your healthcare professional's instructions.                Follow-ups after your visit        Your next 10 appointments already scheduled     Apr 20, 2018  8:40 AM CDT   [facility]   [facility]    [address] [place] [id number]   [phone]            May 25, 2018  7:40 AM CDT   [facility] Return with Marie Larson PA-C   [facility]    [address] [place] [address]   [phone]              Who to contact     Normal or non-critical lab and imaging results will be communicated to you by MyChart, letter or phone within 4 business days after the clinic has received the results. If you do not hear from us within 7 days, please contact the clinic through MyChart or phone. If you have a critical or abnormal lab result, we will notify you by phone as soon as possible.  Submit refill requests through [employer] or call your pharmacy and they will forward the refill request to us. Please allow 3 business days for your refill to be completed.          If you need to speak with a  for additional information , please call: [phone]            "Additional Information About Your Visit        MyChart Information     [employer] gives you secure access to your electronic health record. If you see a primary care provider, you can also send messages to your care team and make appointments. If you have questions, please call your primary care clinic.  If you do not have a primary care provider, please call [phone] and they will assist you.        Care EveryWhere ID     This is your Care EveryWhere ID. This could be used by other organizations to access your [facility] medical records  [id number]        Your Vitals Were     Pulse Temperature Height Last Period BMI (Body Mass Index)       96 99.1  F (37.3  C) (Tympanic) 5' 5.5\" (1.664 m) 03/05/2018 (Approximate) 21.24 kg/m2        Blood Pressure from Last 3 Encounters:   04/05/18 122/76   03/23/18 119/72   02/23/18 122/71    Weight from Last 3 Encounters:   04/05/18 129 lb 9.6 oz (58.8 kg)   12/28/17 130 lb (59 kg)   08/22/17 136 lb (61.7 kg)              Today, you had the following     No orders found for display         Today's Medication Changes          These changes are accurate as of 4/5/18  8:45 AM.  If you have any questions, ask your nurse or doctor.               Start taking these medicines.        Dose/Directions    tobramycin-dexamethasone 0.3-0.1 % ophthalmic susp   Commonly known as:  TOBRADEX   Used for:  Acute bacterial conjunctivitis of both eyes   Started by:  Zenia Lay, GALO CNP        Apply 1 drop in both eye three times per day. Use for 2 days after the redness is gone. Typically 5-7 days total.   Quantity:  1 Bottle   Refills:  1         These medicines have changed or have updated prescriptions.        Dose/Directions    * ISOtretinoin 40 MG capsule   Commonly known as:  ACCUTANE   This may have changed:  Another medication with the same name was removed. Continue taking this medication, and follow the directions you see here.   Used for:  Acne vulgaris   Changed by:  Alireza" GALO Horowitz CNP        Take one tablet daily in the evening.   Quantity:  30 capsule   Refills:  0       * ISOtretinoin 20 MG capsule   Commonly known as:  ACCUTANE   This may have changed:  Another medication with the same name was removed. Continue taking this medication, and follow the directions you see here.   Used for:  Acne vulgaris   Changed by:  Zenia Lay APRN CNP        Take one tablet daily in the morning.   Quantity:  30 capsule   Refills:  0       * Notice:  This list has 2 medication(s) that are the same as other medications prescribed for you. Read the directions carefully, and ask your doctor or other care provider to review them with you.         Where to get your medicines      These medications were sent to [facility] [address] [facility] [address]  [address] [address]     Phone:  [phone]     tobramycin-dexamethasone 0.3-0.1 % ophthalmic susp                Primary Care Provider Office Phone # Fax #    Tere Griggs PA-C [phone] [address] [place] [address]        Equal Access to Services     [facility] : Hadii reyes ku hadasho Socamille, waaxda luqadaha, qaybta kaalmada adeherberth, darlene campbell. So [facility] [phone].    ATENCIÓN: Si habla español, tiene a white disposición servicios gratuitos de asistencia lingüística. [facility] [phone].    We comply with applicable federal civil rights laws and [place] laws. We do not discriminate on the basis of race, color, national origin, age, disability, sex, sexual orientation, or gender identity.            Thank you!     Thank you for choosing [facility]  for your care. Our goal is always to provide you with excellent care. Hearing back from our patients is one way we can continue to improve our services. Please take a few minutes to complete the written survey that you may receive in the mail  after your visit with us. Thank you!             Your Updated Medication List - Protect others around you: Learn how to safely use, store and throw away your medicines at www.disposemymeds.org.          This list is accurate as of 4/5/18  8:45 AM.  Always use your most recent med list.                   Brand Name Dispense Instructions for use Diagnosis    * ISOtretinoin 40 MG capsule    ACCUTANE    30 capsule    Take one tablet daily in the evening.    Acne vulgaris       * ISOtretinoin 20 MG capsule    ACCUTANE    30 capsule    Take one tablet daily in the morning.    Acne vulgaris       levonorgestrel-ethinyl estradiol 0.1-20 MG-MCG per tablet    AVIANE,ALESSE,LESSINA    84 tablet    Take 1 tablet by mouth daily    Encounter for initial prescription of contraceptive pills       tobramycin-dexamethasone 0.3-0.1 % ophthalmic susp    TOBRADEX    1 Bottle    Apply 1 drop in both eye three times per day. Use for 2 days after the redness is gone. Typically 5-7 days total.    Acute bacterial conjunctivitis of both eyes       triamcinolone 0.1 % cream    KENALOG    15 g    Apply topically 3 times daily Apply to the back of the head    Eczema       * Notice:  This list has 2 medication(s) that are the same as other medications prescribed for you. Read the directions carefully, and ask your doctor or other care provider to review them with you.

## 2018-04-20 ENCOUNTER — OFFICE VISIT (OUTPATIENT)
Dept: DERMATOLOGY | Facility: CLINIC | Age: 23
End: 2018-04-20
Payer: COMMERCIAL

## 2018-04-20 VITALS — DIASTOLIC BLOOD PRESSURE: 79 MMHG | OXYGEN SATURATION: 100 % | SYSTOLIC BLOOD PRESSURE: 113 MMHG | HEART RATE: 90 BPM

## 2018-04-20 DIAGNOSIS — L70.0 ACNE VULGARIS: ICD-10-CM

## 2018-04-20 LAB
ALBUMIN SERPL-MCNC: 3.8 G/DL (ref 3.4–5)
ALP SERPL-CCNC: 63 U/L (ref 40–150)
ALT SERPL W P-5'-P-CCNC: 16 U/L (ref 0–50)
ANION GAP SERPL CALCULATED.3IONS-SCNC: 6 MMOL/L (ref 3–14)
AST SERPL W P-5'-P-CCNC: 14 U/L (ref 0–45)
BETA HCG QUAL IFA URINE: NEGATIVE
BILIRUB SERPL-MCNC: 0.9 MG/DL (ref 0.2–1.3)
BUN SERPL-MCNC: 11 MG/DL (ref 7–30)
CALCIUM SERPL-MCNC: 8.9 MG/DL (ref 8.5–10.1)
CHLORIDE SERPL-SCNC: 106 MMOL/L (ref 94–109)
CHOLEST SERPL-MCNC: 190 MG/DL
CO2 SERPL-SCNC: 26 MMOL/L (ref 20–32)
CREAT SERPL-MCNC: 0.87 MG/DL (ref 0.52–1.04)
ERYTHROCYTE [DISTWIDTH] IN BLOOD BY AUTOMATED COUNT: 12.8 % (ref 10–15)
GFR SERPL CREATININE-BSD FRML MDRD: 80 ML/MIN/1.7M2
GLUCOSE SERPL-MCNC: 90 MG/DL (ref 70–99)
HCT VFR BLD AUTO: 42.2 % (ref 35–47)
HDLC SERPL-MCNC: 39 MG/DL
HGB BLD-MCNC: 13.5 G/DL (ref 11.7–15.7)
LDLC SERPL CALC-MCNC: 130 MG/DL
MCH RBC QN AUTO: 28.7 PG (ref 26.5–33)
MCHC RBC AUTO-ENTMCNC: 32 G/DL (ref 31.5–36.5)
MCV RBC AUTO: 90 FL (ref 78–100)
NONHDLC SERPL-MCNC: 151 MG/DL
PLATELET # BLD AUTO: 277 10E9/L (ref 150–450)
POTASSIUM SERPL-SCNC: 4.5 MMOL/L (ref 3.4–5.3)
PROT SERPL-MCNC: 7.8 G/DL (ref 6.8–8.8)
RBC # BLD AUTO: 4.7 10E12/L (ref 3.8–5.2)
SODIUM SERPL-SCNC: 138 MMOL/L (ref 133–144)
TRIGL SERPL-MCNC: 104 MG/DL
WBC # BLD AUTO: 8.6 10E9/L (ref 4–11)

## 2018-04-20 PROCEDURE — 80061 LIPID PANEL: CPT | Performed by: PHYSICIAN ASSISTANT

## 2018-04-20 PROCEDURE — 84703 CHORIONIC GONADOTROPIN ASSAY: CPT | Performed by: PHYSICIAN ASSISTANT

## 2018-04-20 PROCEDURE — 36415 COLL VENOUS BLD VENIPUNCTURE: CPT | Performed by: PHYSICIAN ASSISTANT

## 2018-04-20 PROCEDURE — 80053 COMPREHEN METABOLIC PANEL: CPT | Performed by: PHYSICIAN ASSISTANT

## 2018-04-20 PROCEDURE — 85027 COMPLETE CBC AUTOMATED: CPT | Performed by: PHYSICIAN ASSISTANT

## 2018-04-20 PROCEDURE — 99213 OFFICE O/P EST LOW 20 MIN: CPT | Performed by: PHYSICIAN ASSISTANT

## 2018-04-20 RX ORDER — ISOTRETINOIN 40 MG/1
CAPSULE ORAL
Qty: 30 CAPSULE | Refills: 0 | Status: SHIPPED | OUTPATIENT
Start: 2018-04-20 | End: 2018-05-24

## 2018-04-20 RX ORDER — ISOTRETINOIN 20 MG/1
CAPSULE ORAL
Qty: 30 CAPSULE | Refills: 0 | Status: SHIPPED | OUTPATIENT
Start: 2018-04-20 | End: 2018-05-24

## 2018-04-20 NOTE — NURSING NOTE
"Initial /79  Pulse 90  SpO2 100% Estimated body mass index is 21.24 kg/(m^2) as calculated from the following:    Height as of 4/5/18: 1.664 m (5' 5.5\").    Weight as of 4/5/18: 58.8 kg (129 lb 9.6 oz). .      "

## 2018-04-20 NOTE — MR AVS SNAPSHOT
After Visit Summary   4/20/2018    Vira Salgado    MRN: [id number]           Patient Information     Date Of Birth          1995        Visit Information        Provider Department      4/20/2018 8:40 AM Marie Larson PA-C [facility]        Today's Diagnoses     Acne vulgaris           Follow-ups after your visit        Your next 10 appointments already scheduled     May 25, 2018  7:40 AM CDT   [facility]   [facility] ([facility])    [address] [address]   [phone]              Who to contact     If you have questions or need follow up information about today's clinic visit or your schedule please contact [facility] directly at [phone].  Normal or non-critical lab and imaging results will be communicated to you by CaptureProofhart, letter or phone within 4 business days after the clinic has received the results. If you do not hear from us within 7 days, please contact the clinic through CaptureProofhart or phone. If you have a critical or abnormal lab result, we will notify you by phone as soon as possible.  Submit refill requests through [employer] or call your pharmacy and they will forward the refill request to us. Please allow 3 business days for your refill to be completed.          Additional Information About Your Visit        MyChart Information     [employer] gives you secure access to your electronic health record. If you see a primary care provider, you can also send messages to your care team and make appointments. If you have questions, please call your primary care clinic.  If you do not have a primary care provider, please call [phone] and they will assist you.        Care EveryWhere ID     This is your Care EveryWhere ID. This could be used by other organizations to access your [facility] medical records  [id number]        Your Vitals Were     Pulse  Pulse Oximetry                90 100%           Blood Pressure from Last 3 Encounters:   04/20/18 113/79   04/05/18 122/76   03/23/18 119/72    Weight from Last 3 Encounters:   04/05/18 58.8 kg (129 lb 9.6 oz)   12/28/17 59 kg (130 lb)   08/22/17 61.7 kg (136 lb)              We Performed the Following     Beta HCG qual IFA urine     CBC with platelets     Comprehensive metabolic panel     Lipid panel reflex to direct LDL Fasting          Where to get your medicines      These medications were sent to [facility] - [place], [place] - [address]  [address], [place] [address]     Phone:  [phone]     ISOtretinoin 20 MG capsule    ISOtretinoin 40 MG capsule          Primary Care Provider Office Phone # Fax #    Tere Griggs PA-C [phone] [address] [place] [id number]        Equal Access to Services     ELENA SNOW : Hadii aad ku hadasho Soomaali, waaxda luqadaha, qaybta kaalmada adeegyada, waxay austinin mike marks . So [facility] [phone].    ATENCIÓN: Si habla español, tiene a white disposición servicios gratuitos de asistencia lingüística. [facility] [phone].    We comply with applicable federal civil rights laws and [place] laws. We do not discriminate on the basis of race, color, national origin, age, disability, sex, sexual orientation, or gender identity.            Thank you!     Thank you for choosing [facility]  for your care. Our goal is always to provide you with excellent care. Hearing back from our patients is one way we can continue to improve our services. Please take a few minutes to complete the written survey that you may receive in the mail after your visit with us. Thank you!             Your Updated Medication List - Protect others around you: Learn how to safely use, store and throw away your medicines at www.disposemymeds.org.          This list is accurate as of 4/20/18  9:12 AM.   Always use your most recent med list.                   Brand Name Dispense Instructions for use Diagnosis    * ISOtretinoin 40 MG capsule    ACCUTANE    30 capsule    Take one tablet daily in the evening.    Acne vulgaris       * ISOtretinoin 20 MG capsule    ACCUTANE    30 capsule    Take one tablet daily in the morning.    Acne vulgaris       levonorgestrel-ethinyl estradiol 0.1-20 MG-MCG per tablet    AVIANMYLENEKRZYSZTOFELIER    84 tablet    Take 1 tablet by mouth daily    Encounter for initial prescription of contraceptive pills       tobramycin-dexamethasone 0.3-0.1 % ophthalmic susp    TOBRADEX    1 Bottle    Apply 1 drop in both eye three times per day. Use for 2 days after the redness is gone. Typically 5-7 days total.    Acute bacterial conjunctivitis of both eyes       triamcinolone 0.1 % cream    KENALOG    15 g    Apply topically 3 times daily Apply to the back of the head    Eczema       * Notice:  This list has 2 medication(s) that are the same as other medications prescribed for you. Read the directions carefully, and ask your doctor or other care provider to review them with you.

## 2018-04-20 NOTE — LETTER
4/20/2018         RE: Vira Salgado  [address] [address]        Dear Colleague,    Thank you for referring your patient, Vira Salgado, to the [facility]. Please see a copy of my visit note below.    Vira Salgado is a 22 year old year old female patient here today  recheck acne vulgaris.  Patient has finished third month of isotretinoin at 60 mg daily. She reports she has noticed minor improvement. She notes more dryness to eyes, lips, and face and denies any side effects. No mood changes.  Patient has no other skin complaints today.  Remainder of the HPI, Meds, PMH, Allergies, FH, and SH was reviewed in chart.    Pertinent Hx:  Acne Vulgaris   History reviewed. No pertinent past medical history.    Past Surgical History:   Procedure Laterality Date     NO HISTORY OF SURGERY          Family History   Problem Relation Age of Onset     Blood Disease Father      leukemia     CEREBROVASCULAR DISEASE Father      Hypertension Mother      Thyroid Disease Mother      DIABETES Paternal Grandmother      Hypertension Paternal Grandmother      Asthma No family hx of      C.A.D. No family hx of      Breast Cancer No family hx of      Cancer - colorectal No family hx of      Prostate Cancer No family hx of        Social History     Social History     Marital status: Single     Spouse name: N/A     Number of children: 0     Years of education: 12+     Occupational History      Student     Social History Main Topics     Smoking status: Never Smoker     Smokeless tobacco: Never Used     Alcohol use No     Drug use: No     Sexual activity: No     Other Topics Concern     Parent/Sibling W/ Cabg, Mi Or Angioplasty Before 65f 55m? No     Social History Narrative       Outpatient Encounter Prescriptions as of 4/20/2018   Medication Sig Dispense Refill     ISOtretinoin (ACCUTANE) 20 MG capsule Take one tablet daily in the morning. 30 capsule 0     ISOtretinoin (ACCUTANE) 40 MG capsule Take one  tablet daily in the evening. 30 capsule 0     levonorgestrel-ethinyl estradiol (AVIANE,ALESSE,LESSINA) 0.1-20 MG-MCG per tablet Take 1 tablet by mouth daily 84 tablet 3     tobramycin-dexamethasone (TOBRADEX) 0.3-0.1 % ophthalmic susp Apply 1 drop in both eye three times per day. Use for 2 days after the redness is gone. Typically 5-7 days total. 1 Bottle 1     triamcinolone (KENALOG) 0.1 % cream Apply topically 3 times daily Apply to the back of the head 15 g 0     [DISCONTINUED] ISOtretinoin (ACCUTANE) 20 MG capsule Take one tablet daily in the morning. 30 capsule 0     [DISCONTINUED] ISOtretinoin (ACCUTANE) 40 MG capsule Take one tablet daily in the evening. 30 capsule 0     No facility-administered encounter medications on file as of 4/20/2018.              Review Of Systems  Skin: As above  Eyes: negative  Ears/Nose/Throat: negative  Respiratory: No shortness of breath, dyspnea on exertion, cough, or hemoptysis  Cardiovascular: negative  Gastrointestinal: negative  Genitourinary: negative  Musculoskeletal: negative  Neurologic: negative  Psychiatric: negative  Hematologic/Lymphatic/Immunologic: negative  Endocrine: negative      O:   NAD, WDWN, Alert & Oriented, Mood & Affect wnl, Vitals stable   Here today alone   /79  Pulse 90  SpO2 100%   General appearance normal   Vitals stable   Alert, oriented and in no acute distress     Pink macules on face       Eyes: Conjunctivae/lids:Normal     ENT: Lips: normal    MSK:Normal    Pulm: Breathing Normal    Neuro/Psych: Orientation:Normal; Mood/Affect:Normal  A/P:  1. Acne vulgaris   Continue 60 mg daily with food.   Current Dosage: 4800 mg   Goal dosage: 9171-6997 mg   Patient is abstinent.   Standing CBC, CMP and fasting lipids  Ipledge reviewed with patient and Ipledge consent form complete  Patient place in ipledge system  Ipledge:[id number]  Return to clinic 30 days  Dry lips and mouth, minor swelling of the eyelids or lips, crusty skin, nosebleeds, GI  upset, or thinning of hair may occur. If any of these effects persist or worsen, tell your doctor or pharmacist promptly.   To relieve dry mouth, suck on (sugarless) hard candy or ice chips, chew (sugarless) gum, drink water.   Remember that your doctor has prescribed this medication because he or she has judged that the benefit to you is greater than the risk of side effects. Many people using this medication do not have serious side effects.   Contact office immediately if you have any of these unlikely but serious side effects: mental/mood changes (e.g., depression,  aggressive or violent behavior, and in rare cases, thoughts of suicide), tingling feeling in the skin, quick/severe sun sensitivity, back/joint/muscle pain, signs of infection (e.g., fever, persistent sore throat, painful swallowing, peeling skin on palms/soles.   Isotretinoin may infrequently cause disease of the pancreatitis, that may rarely be fatal. Stop taking this medication and contact office immediately if you develop: severe stomach pain severe or persistent GI upset,   Stop taking this medication and tell your doctor immediately if you develop these unlikely but very serious side effects: severe headache, vision changes, ear ringing, hearling loss, chest pain, yellowing eyes, skin, dark urine, severe diarrhea, rectal bleeding,   Seek immediate medical attention if you notice any symptoms of a serious allergic reaction.      Accutane is discussed fully with the patient. It is a very effective drug to treat acne vulgaris but has many potential significant side effects. Chief among these are teratogensis, hepatic injury, dyslipidemia and severe drying of the mucous membranes. All of these issues have been discussed in details. Monthly blood tests to monitor lipids and liver functions will be necessary. Expect painful dryness and/or fissuring around the lips, eyes, and other moist areas of the body. Balms may be protective. Contact lens may be  too painful to wear temporarily while on this drug. Episodes of significant depression have been reported, including suicidal ideation and attempts in rare cases. It may also cause pseudotumor cerebri and hyperostosis. The patient will report any such changes in mood, depressive symptoms or suicidal thoughts, headaches, joint or bone pains. There is also a possible association with inflammatory bowel disease, although this is unproven at this point.        Again, thank you for allowing me to participate in the care of your patient.        Sincerely,        Marie Arellano PA-C

## 2018-05-24 ENCOUNTER — OFFICE VISIT (OUTPATIENT)
Dept: DERMATOLOGY | Facility: CLINIC | Age: 23
End: 2018-05-24
Payer: COMMERCIAL

## 2018-05-24 VITALS — DIASTOLIC BLOOD PRESSURE: 68 MMHG | HEART RATE: 61 BPM | OXYGEN SATURATION: 99 % | SYSTOLIC BLOOD PRESSURE: 110 MMHG

## 2018-05-24 DIAGNOSIS — L70.0 ACNE VULGARIS: ICD-10-CM

## 2018-05-24 LAB
ALBUMIN SERPL-MCNC: 3.9 G/DL (ref 3.4–5)
ALP SERPL-CCNC: 64 U/L (ref 40–150)
ALT SERPL W P-5'-P-CCNC: 10 U/L (ref 0–50)
ANION GAP SERPL CALCULATED.3IONS-SCNC: 7 MMOL/L (ref 3–14)
AST SERPL W P-5'-P-CCNC: 18 U/L (ref 0–45)
BETA HCG QUAL IFA URINE: NEGATIVE
BILIRUB SERPL-MCNC: 0.9 MG/DL (ref 0.2–1.3)
BUN SERPL-MCNC: 17 MG/DL (ref 7–30)
CALCIUM SERPL-MCNC: 9 MG/DL (ref 8.5–10.1)
CHLORIDE SERPL-SCNC: 106 MMOL/L (ref 94–109)
CHOLEST SERPL-MCNC: 225 MG/DL
CO2 SERPL-SCNC: 26 MMOL/L (ref 20–32)
CREAT SERPL-MCNC: 0.94 MG/DL (ref 0.52–1.04)
ERYTHROCYTE [DISTWIDTH] IN BLOOD BY AUTOMATED COUNT: 12.8 % (ref 10–15)
GFR SERPL CREATININE-BSD FRML MDRD: 73 ML/MIN/1.7M2
GLUCOSE SERPL-MCNC: 79 MG/DL (ref 70–99)
HCT VFR BLD AUTO: 41 % (ref 35–47)
HDLC SERPL-MCNC: 50 MG/DL
HGB BLD-MCNC: 13.1 G/DL (ref 11.7–15.7)
LDLC SERPL CALC-MCNC: 155 MG/DL
MCH RBC QN AUTO: 29 PG (ref 26.5–33)
MCHC RBC AUTO-ENTMCNC: 32 G/DL (ref 31.5–36.5)
MCV RBC AUTO: 91 FL (ref 78–100)
NONHDLC SERPL-MCNC: 175 MG/DL
PLATELET # BLD AUTO: 223 10E9/L (ref 150–450)
POTASSIUM SERPL-SCNC: 4.3 MMOL/L (ref 3.4–5.3)
PROT SERPL-MCNC: 7.8 G/DL (ref 6.8–8.8)
RBC # BLD AUTO: 4.51 10E12/L (ref 3.8–5.2)
SODIUM SERPL-SCNC: 139 MMOL/L (ref 133–144)
TRIGL SERPL-MCNC: 102 MG/DL
WBC # BLD AUTO: 7 10E9/L (ref 4–11)

## 2018-05-24 PROCEDURE — 80061 LIPID PANEL: CPT | Performed by: PHYSICIAN ASSISTANT

## 2018-05-24 PROCEDURE — 85027 COMPLETE CBC AUTOMATED: CPT | Performed by: PHYSICIAN ASSISTANT

## 2018-05-24 PROCEDURE — 36415 COLL VENOUS BLD VENIPUNCTURE: CPT | Performed by: PHYSICIAN ASSISTANT

## 2018-05-24 PROCEDURE — 80053 COMPREHEN METABOLIC PANEL: CPT | Performed by: PHYSICIAN ASSISTANT

## 2018-05-24 PROCEDURE — 84703 CHORIONIC GONADOTROPIN ASSAY: CPT | Performed by: PHYSICIAN ASSISTANT

## 2018-05-24 PROCEDURE — 99213 OFFICE O/P EST LOW 20 MIN: CPT | Performed by: PHYSICIAN ASSISTANT

## 2018-05-24 RX ORDER — ISOTRETINOIN 40 MG/1
CAPSULE ORAL
Qty: 30 CAPSULE | Refills: 0 | Status: SHIPPED | OUTPATIENT
Start: 2018-05-24 | End: 2018-07-24

## 2018-05-24 RX ORDER — ISOTRETINOIN 20 MG/1
CAPSULE ORAL
Qty: 30 CAPSULE | Refills: 0 | Status: SHIPPED | OUTPATIENT
Start: 2018-05-24 | End: 2018-07-24

## 2018-05-24 NOTE — PROGRESS NOTES
Vira Salgado is a 22 year old year old female patient here today for recheck acne vulgaris.  Patient has finished fourth month of isotretinoin at 60 mg daily. She reports she has noticed minor improvement. She notes more dryness to eyes, lips, neck and face and denies any side effects. No mood changes.  Patient has no other skin complaints today.  Remainder of the HPI, Meds, PMH, Allergies, FH, and SH was reviewed in chart.    Pertinent Hx:   Acne Vulgaris   History reviewed. No pertinent past medical history.    Past Surgical History:   Procedure Laterality Date     NO HISTORY OF SURGERY          Family History   Problem Relation Age of Onset     Blood Disease Father      leukemia     CEREBROVASCULAR DISEASE Father      Hypertension Mother      Thyroid Disease Mother      DIABETES Paternal Grandmother      Hypertension Paternal Grandmother      Asthma No family hx of      C.A.D. No family hx of      Breast Cancer No family hx of      Cancer - colorectal No family hx of      Prostate Cancer No family hx of        Social History     Social History     Marital status: Single     Spouse name: N/A     Number of children: 0     Years of education: 12+     Occupational History      Student     Social History Main Topics     Smoking status: Never Smoker     Smokeless tobacco: Never Used     Alcohol use No     Drug use: No     Sexual activity: No     Other Topics Concern     Parent/Sibling W/ Cabg, Mi Or Angioplasty Before 65f 55m? No     Social History Narrative       Outpatient Encounter Prescriptions as of 5/24/2018   Medication Sig Dispense Refill     ISOtretinoin (ACCUTANE) 20 MG capsule Take one tablet daily in the morning. 30 capsule 0     ISOtretinoin (ACCUTANE) 40 MG capsule Take one tablet daily in the evening. 30 capsule 0     levonorgestrel-ethinyl estradiol (AVIANE,ALESSE,LESSINA) 0.1-20 MG-MCG per tablet Take 1 tablet by mouth daily 84 tablet 3     triamcinolone (KENALOG) 0.1 % cream Apply topically 3 times  daily Apply to the back of the head (Patient not taking: Reported on 5/24/2018) 15 g 0     [DISCONTINUED] ISOtretinoin (ACCUTANE) 20 MG capsule Take one tablet daily in the morning. 30 capsule 0     [DISCONTINUED] ISOtretinoin (ACCUTANE) 40 MG capsule Take one tablet daily in the evening. 30 capsule 0     [DISCONTINUED] tobramycin-dexamethasone (TOBRADEX) 0.3-0.1 % ophthalmic susp Apply 1 drop in both eye three times per day. Use for 2 days after the redness is gone. Typically 5-7 days total. 1 Bottle 1     No facility-administered encounter medications on file as of 5/24/2018.              Review Of Systems  Skin: As above  Eyes: negative  Ears/Nose/Throat: negative  Respiratory: No shortness of breath, dyspnea on exertion, cough, or hemoptysis  Cardiovascular: negative  Gastrointestinal: negative  Genitourinary: negative  Musculoskeletal: negative  Neurologic: negative  Psychiatric: negative  Hematologic/Lymphatic/Immunologic: negative  Endocrine: negative      O:   NAD, WDWN, Alert & Oriented, Mood & Affect wnl, Vitals stable   Here today alone   /68  Pulse 61  SpO2 99%   General appearance normal   Vitals stable   Alert, oriented and in no acute distress     No visible inflammatory papules   Pink xerotic plaque on left neck       Eyes: Conjunctivae/lids:Normal     ENT: Lips: normal    MSK:Normal    Pulm: Breathing Normal    Neuro/Psych: Orientation:Normal; Mood/Affect:Normal  A/P:  1. Acne Vulgaris- xerotic eczema  Offered topical steroids which patient decided against.  Continue 60 mg daily with food.   Current Dosage: 6600 mg   Goal dosage: 6386-3887 mg   Patient is abstinent.   Standing CBC, CMP and fasting lipids  Ipledge reviewed with patient and Ipledge consent form complete  Patient place in ipledge system  Ipledge:[id number]  Return to clinic 30 days  Dry lips and mouth, minor swelling of the eyelids or lips, crusty skin, nosebleeds, GI upset, or thinning of hair may occur. If any of these  effects persist or worsen, tell your doctor or pharmacist promptly.   To relieve dry mouth, suck on (sugarless) hard candy or ice chips, chew (sugarless) gum, drink water.   Remember that your doctor has prescribed this medication because he or she has judged that the benefit to you is greater than the risk of side effects. Many people using this medication do not have serious side effects.   Contact office immediately if you have any of these unlikely but serious side effects: mental/mood changes (e.g., depression,  aggressive or violent behavior, and in rare cases, thoughts of suicide), tingling feeling in the skin, quick/severe sun sensitivity, back/joint/muscle pain, signs of infection (e.g., fever, persistent sore throat, painful swallowing, peeling skin on palms/soles.   Isotretinoin may infrequently cause disease of the pancreatitis, that may rarely be fatal. Stop taking this medication and contact office immediately if you develop: severe stomach pain severe or persistent GI upset,   Stop taking this medication and tell your doctor immediately if you develop these unlikely but very serious side effects: severe headache, vision changes, ear ringing, hearling loss, chest pain, yellowing eyes, skin, dark urine, severe diarrhea, rectal bleeding,   Seek immediate medical attention if you notice any symptoms of a serious allergic reaction.      Accutane is discussed fully with the patient. It is a very effective drug to treat acne vulgaris but has many potential significant side effects. Chief among these are teratogensis, hepatic injury, dyslipidemia and severe drying of the mucous membranes. All of these issues have been discussed in details. Monthly blood tests to monitor lipids and liver functions will be necessary. Expect painful dryness and/or fissuring around the lips, eyes, and other moist areas of the body. Balms may be protective. Contact lens may be too painful to wear temporarily while on this drug.  Episodes of significant depression have been reported, including suicidal ideation and attempts in rare cases. It may also cause pseudotumor cerebri and hyperostosis. The patient will report any such changes in mood, depressive symptoms or suicidal thoughts, headaches, joint or bone pains. There is also a possible association with inflammatory bowel disease, although this is unproven at this point.

## 2018-05-24 NOTE — LETTER
5/24/2018         RE: Vira Salgado  [address] [facility] [address]        Dear Colleague,    Thank you for referring your patient, Vira Salgado, to the [facility]. Please see a copy of my visit note below.    Vira Salgado is a 22 year old year old female patient here today for recheck acne vulgaris.  Patient has finished fourth month of isotretinoin at 60 mg daily. She reports she has noticed minor improvement. She notes more dryness to eyes, lips, neck and face and denies any side effects. No mood changes.  Patient has no other skin complaints today.  Remainder of the HPI, Meds, PMH, Allergies, FH, and SH was reviewed in chart.    Pertinent Hx:   Acne Vulgaris   History reviewed. No pertinent past medical history.    Past Surgical History:   Procedure Laterality Date     NO HISTORY OF SURGERY          Family History   Problem Relation Age of Onset     Blood Disease Father      leukemia     CEREBROVASCULAR DISEASE Father      Hypertension Mother      Thyroid Disease Mother      DIABETES Paternal Grandmother      Hypertension Paternal Grandmother      Asthma No family hx of      C.A.D. No family hx of      Breast Cancer No family hx of      Cancer - colorectal No family hx of      Prostate Cancer No family hx of        Social History     Social History     Marital status: Single     Spouse name: N/A     Number of children: 0     Years of education: 12+     Occupational History      Student     Social History Main Topics     Smoking status: Never Smoker     Smokeless tobacco: Never Used     Alcohol use No     Drug use: No     Sexual activity: No     Other Topics Concern     Parent/Sibling W/ Cabg, Mi Or Angioplasty Before 65f 55m? No     Social History Narrative       Outpatient Encounter Prescriptions as of 5/24/2018   Medication Sig Dispense Refill     ISOtretinoin (ACCUTANE) 20 MG capsule Take one tablet daily in the morning. 30 capsule 0     ISOtretinoin (ACCUTANE) 40 MG capsule  Take one tablet daily in the evening. 30 capsule 0     levonorgestrel-ethinyl estradiol (AVIANE,ALESSE,LESSINA) 0.1-20 MG-MCG per tablet Take 1 tablet by mouth daily 84 tablet 3     triamcinolone (KENALOG) 0.1 % cream Apply topically 3 times daily Apply to the back of the head (Patient not taking: Reported on 5/24/2018) 15 g 0     [DISCONTINUED] ISOtretinoin (ACCUTANE) 20 MG capsule Take one tablet daily in the morning. 30 capsule 0     [DISCONTINUED] ISOtretinoin (ACCUTANE) 40 MG capsule Take one tablet daily in the evening. 30 capsule 0     [DISCONTINUED] tobramycin-dexamethasone (TOBRADEX) 0.3-0.1 % ophthalmic susp Apply 1 drop in both eye three times per day. Use for 2 days after the redness is gone. Typically 5-7 days total. 1 Bottle 1     No facility-administered encounter medications on file as of 5/24/2018.              Review Of Systems  Skin: As above  Eyes: negative  Ears/Nose/Throat: negative  Respiratory: No shortness of breath, dyspnea on exertion, cough, or hemoptysis  Cardiovascular: negative  Gastrointestinal: negative  Genitourinary: negative  Musculoskeletal: negative  Neurologic: negative  Psychiatric: negative  Hematologic/Lymphatic/Immunologic: negative  Endocrine: negative      O:   NAD, WDWN, Alert & Oriented, Mood & Affect wnl, Vitals stable   Here today alone   /68  Pulse 61  SpO2 99%   General appearance normal   Vitals stable   Alert, oriented and in no acute distress     No visible inflammatory papules   Pink xerotic plaque on left neck       Eyes: Conjunctivae/lids:Normal     ENT: Lips: normal    MSK:Normal    Pulm: Breathing Normal    Neuro/Psych: Orientation:Normal; Mood/Affect:Normal  A/P:  1. Acne Vulgaris- xerotic eczema  Offered topical steroids which patient decided against.  Continue 60 mg daily with food.   Current Dosage: 6600 mg   Goal dosage: 6924-5803 mg   Patient is abstinent.   Standing CBC, CMP and fasting lipids  Ipledge reviewed with patient and Ipledge consent  form complete  Patient place in ipledge system  Ipledge:[id number]  Return to clinic 30 days  Dry lips and mouth, minor swelling of the eyelids or lips, crusty skin, nosebleeds, GI upset, or thinning of hair may occur. If any of these effects persist or worsen, tell your doctor or pharmacist promptly.   To relieve dry mouth, suck on (sugarless) hard candy or ice chips, chew (sugarless) gum, drink water.   Remember that your doctor has prescribed this medication because he or she has judged that the benefit to you is greater than the risk of side effects. Many people using this medication do not have serious side effects.   Contact office immediately if you have any of these unlikely but serious side effects: mental/mood changes (e.g., depression,  aggressive or violent behavior, and in rare cases, thoughts of suicide), tingling feeling in the skin, quick/severe sun sensitivity, back/joint/muscle pain, signs of infection (e.g., fever, persistent sore throat, painful swallowing, peeling skin on palms/soles.   Isotretinoin may infrequently cause disease of the pancreatitis, that may rarely be fatal. Stop taking this medication and contact office immediately if you develop: severe stomach pain severe or persistent GI upset,   Stop taking this medication and tell your doctor immediately if you develop these unlikely but very serious side effects: severe headache, vision changes, ear ringing, hearling loss, chest pain, yellowing eyes, skin, dark urine, severe diarrhea, rectal bleeding,   Seek immediate medical attention if you notice any symptoms of a serious allergic reaction.      Accutane is discussed fully with the patient. It is a very effective drug to treat acne vulgaris but has many potential significant side effects. Chief among these are teratogensis, hepatic injury, dyslipidemia and severe drying of the mucous membranes. All of these issues have been discussed in details. Monthly blood tests to monitor lipids  and liver functions will be necessary. Expect painful dryness and/or fissuring around the lips, eyes, and other moist areas of the body. Balms may be protective. Contact lens may be too painful to wear temporarily while on this drug. Episodes of significant depression have been reported, including suicidal ideation and attempts in rare cases. It may also cause pseudotumor cerebri and hyperostosis. The patient will report any such changes in mood, depressive symptoms or suicidal thoughts, headaches, joint or bone pains. There is also a possible association with inflammatory bowel disease, although this is unproven at this point.     Again, thank you for allowing me to participate in the care of your patient.        Sincerely,        Marie Arellano PA-C

## 2018-05-24 NOTE — MR AVS SNAPSHOT
After Visit Summary   5/24/2018    Vira Salgado    MRN: [id number]           Patient Information     Date Of Birth          1995        Visit Information        Provider Department      5/24/2018 7:00 AM Marie Larson PA-C [facility]        Today's Diagnoses     Acne vulgaris           Follow-ups after your visit        Who to contact     If you have questions or need follow up information about today's clinic visit or your schedule please contact [facility] directly at [phone].  Normal or non-critical lab and imaging results will be communicated to you by scanRhart, letter or phone within 4 business days after the clinic has received the results. If you do not hear from us within 7 days, please contact the clinic through Ag[employer] or phone. If you have a critical or abnormal lab result, we will notify you by phone as soon as possible.  Submit refill requests through [employer] or call your pharmacy and they will forward the refill request to us. Please allow 3 business days for your refill to be completed.          Additional Information About Your Visit        MyChart Information     [employer] gives you secure access to your electronic health record. If you see a primary care provider, you can also send messages to your care team and make appointments. If you have questions, please call your primary care clinic.  If you do not have a primary care provider, please call [phone] and they will assist you.        Care EveryWhere ID     This is your Care EveryWhere ID. This could be used by other organizations to access your [place] medical records  [id number]        Your Vitals Were     Pulse Pulse Oximetry                61 99%           Blood Pressure from Last 3 Encounters:   05/24/18 110/68   04/20/18 113/79   04/05/18 122/76    Weight from Last 3 Encounters:   04/05/18 58.8 kg (129 lb 9.6 oz)   12/28/17 59 kg (130 lb)   08/22/17 61.7 kg (136 lb)               We Performed the Following     Beta HCG qual IFA urine     CBC with platelets     Comprehensive metabolic panel     Lipid panel reflex to direct LDL Fasting          Where to get your medicines      These medications were sent to [id number] - [place], [place] - [address]  [address] [place] [address]     Phone:  [phone]     ISOtretinoin 20 MG capsule    ISOtretinoin 40 MG capsule          Primary Care Provider Office Phone # Fax #    Tere Griggs PA-C [phone] [address] [facility] [address]        Equal Access to Services     [facility] : Hadii aad ku hadasho Soomaali, waaxda luqadaha, qaybta kaalmada adeegyada, waxroberto quinones hayaan ademargoth marks . So [facility] [phone].    ATENCIÓN: Si habla español, tiene a white disposición servicios gratuitos de asistencia lingüística. [facility] [phone].    We comply with applicable federal civil rights laws and [place] laws. We do not discriminate on the basis of race, color, national origin, age, disability, sex, sexual orientation, or gender identity.            Thank you!     Thank you for choosing [facility]  for your care. Our goal is always to provide you with excellent care. Hearing back from our patients is one way we can continue to improve our services. Please take a few minutes to complete the written survey that you may receive in the mail after your visit with us. Thank you!             Your Updated Medication List - Protect others around you: Learn how to safely use, store and throw away your medicines at www.disposemymeds.org.          This list is accurate as of 5/24/18  7:35 AM.  Always use your most recent med list.                   Brand Name Dispense Instructions for use Diagnosis    * ISOtretinoin 40 MG capsule    ACCUTANE    30 capsule    Take one tablet daily in the evening.    Acne vulgaris       * ISOtretinoin 20 MG capsule    ACCUTANE    30  capsule    Take one tablet daily in the morning.    Acne vulgaris       levonorgestrel-ethinyl estradiol 0.1-20 MG-MCG per tablet    AVIANE,ALESSE,LESSINA    84 tablet    Take 1 tablet by mouth daily    Encounter for initial prescription of contraceptive pills       triamcinolone 0.1 % cream    KENALOG    15 g    Apply topically 3 times daily Apply to the back of the head    Eczema       * Notice:  This list has 2 medication(s) that are the same as other medications prescribed for you. Read the directions carefully, and ask your doctor or other care provider to review them with you.

## 2018-05-24 NOTE — NURSING NOTE
"Initial /68  Pulse 61  SpO2 99% Estimated body mass index is 21.24 kg/(m^2) as calculated from the following:    Height as of 4/5/18: 1.664 m (5' 5.5\").    Weight as of 4/5/18: 58.8 kg (129 lb 9.6 oz). .    Annia Fu LPN    "

## 2018-06-27 ENCOUNTER — OFFICE VISIT (OUTPATIENT)
Dept: DERMATOLOGY | Facility: CLINIC | Age: 23
End: 2018-06-27
Payer: COMMERCIAL

## 2018-06-27 VITALS — HEART RATE: 82 BPM | OXYGEN SATURATION: 98 % | DIASTOLIC BLOOD PRESSURE: 75 MMHG | SYSTOLIC BLOOD PRESSURE: 118 MMHG

## 2018-06-27 DIAGNOSIS — L70.0 ACNE VULGARIS: ICD-10-CM

## 2018-06-27 DIAGNOSIS — L30.9 DERMATITIS: Primary | ICD-10-CM

## 2018-06-27 LAB
ALBUMIN SERPL-MCNC: 3.9 G/DL (ref 3.4–5)
ALP SERPL-CCNC: 74 U/L (ref 40–150)
ALT SERPL W P-5'-P-CCNC: 18 U/L (ref 0–50)
ANION GAP SERPL CALCULATED.3IONS-SCNC: 7 MMOL/L (ref 3–14)
AST SERPL W P-5'-P-CCNC: 31 U/L (ref 0–45)
BETA HCG QUAL IFA URINE: NEGATIVE
BILIRUB SERPL-MCNC: 1.2 MG/DL (ref 0.2–1.3)
BUN SERPL-MCNC: 14 MG/DL (ref 7–30)
CALCIUM SERPL-MCNC: 9.6 MG/DL (ref 8.5–10.1)
CHLORIDE SERPL-SCNC: 103 MMOL/L (ref 94–109)
CHOLEST SERPL-MCNC: 190 MG/DL
CO2 SERPL-SCNC: 26 MMOL/L (ref 20–32)
CREAT SERPL-MCNC: 0.86 MG/DL (ref 0.52–1.04)
ERYTHROCYTE [DISTWIDTH] IN BLOOD BY AUTOMATED COUNT: 12.4 % (ref 10–15)
GFR SERPL CREATININE-BSD FRML MDRD: 81 ML/MIN/1.7M2
GLUCOSE SERPL-MCNC: 86 MG/DL (ref 70–99)
HCT VFR BLD AUTO: 41.2 % (ref 35–47)
HDLC SERPL-MCNC: 51 MG/DL
HGB BLD-MCNC: 13.5 G/DL (ref 11.7–15.7)
LDLC SERPL CALC-MCNC: 110 MG/DL
MCH RBC QN AUTO: 29.3 PG (ref 26.5–33)
MCHC RBC AUTO-ENTMCNC: 32.8 G/DL (ref 31.5–36.5)
MCV RBC AUTO: 90 FL (ref 78–100)
NONHDLC SERPL-MCNC: 139 MG/DL
PLATELET # BLD AUTO: 255 10E9/L (ref 150–450)
POTASSIUM SERPL-SCNC: 3.6 MMOL/L (ref 3.4–5.3)
PROT SERPL-MCNC: 7.9 G/DL (ref 6.8–8.8)
RBC # BLD AUTO: 4.6 10E12/L (ref 3.8–5.2)
SODIUM SERPL-SCNC: 136 MMOL/L (ref 133–144)
TRIGL SERPL-MCNC: 144 MG/DL
WBC # BLD AUTO: 9.7 10E9/L (ref 4–11)

## 2018-06-27 PROCEDURE — 84703 CHORIONIC GONADOTROPIN ASSAY: CPT | Performed by: PHYSICIAN ASSISTANT

## 2018-06-27 PROCEDURE — 85027 COMPLETE CBC AUTOMATED: CPT | Performed by: PHYSICIAN ASSISTANT

## 2018-06-27 PROCEDURE — 36415 COLL VENOUS BLD VENIPUNCTURE: CPT | Performed by: PHYSICIAN ASSISTANT

## 2018-06-27 PROCEDURE — 80053 COMPREHEN METABOLIC PANEL: CPT | Performed by: PHYSICIAN ASSISTANT

## 2018-06-27 PROCEDURE — 99213 OFFICE O/P EST LOW 20 MIN: CPT | Performed by: PHYSICIAN ASSISTANT

## 2018-06-27 PROCEDURE — 80061 LIPID PANEL: CPT | Performed by: PHYSICIAN ASSISTANT

## 2018-06-27 RX ORDER — TRIAMCINOLONE ACETONIDE 5 MG/G
CREAM TOPICAL
Qty: 30 G | Refills: 0 | Status: SHIPPED | OUTPATIENT
Start: 2018-06-27 | End: 2018-07-24

## 2018-06-27 RX ORDER — PREDNISONE 20 MG/1
20 TABLET ORAL 2 TIMES DAILY
Qty: 10 TABLET | Refills: 1 | Status: SHIPPED | OUTPATIENT
Start: 2018-06-27 | End: 2018-07-02

## 2018-06-27 NOTE — MR AVS SNAPSHOT
After Visit Summary   6/27/2018    Vira Salgado    MRN: [id number]           Patient Information     Date Of Birth          1995        Visit Information        Provider Department      6/27/2018 3:40 PM Marie Larson PA-C [facility]        Today's Diagnoses     Dermatitis    -  1    Acne vulgaris           Follow-ups after your visit        Who to contact     If you have questions or need follow up information about today's clinic visit or your schedule please contact [facility] directly at [phone].  Normal or non-critical lab and imaging results will be communicated to you by Le[employer] Medicalhart, letter or phone within 4 business days after the clinic has received the results. If you do not hear from us within 7 days, please contact the clinic through Anonym[employer] or phone. If you have a critical or abnormal lab result, we will notify you by phone as soon as possible.  Submit refill requests through [employer] or call your pharmacy and they will forward the refill request to us. Please allow 3 business days for your refill to be completed.          Additional Information About Your Visit        MyChart Information     [employer] gives you secure access to your electronic health record. If you see a primary care provider, you can also send messages to your care team and make appointments. If you have questions, please call your primary care clinic.  If you do not have a primary care provider, please call [phone] and they will assist you.        Care EveryWhere ID     This is your Care EveryWhere ID. This could be used by other organizations to access your [place] medical records  [id number]        Your Vitals Were     Pulse Pulse Oximetry                82 98%           Blood Pressure from Last 3 Encounters:   06/27/18 118/75   05/24/18 110/68   04/20/18 113/79    Weight from Last 3 Encounters:   04/05/18 58.8 kg (129 lb 9.6 oz)   12/28/17 59 kg (130 lb)   08/22/17 61.7  kg (136 lb)              We Performed the Following     Beta HCG qual IFA urine     CBC with platelets     Comprehensive metabolic panel     Lipid panel reflex to direct LDL Fasting          Today's Medication Changes          These changes are accurate as of 6/27/18  9:30 PM.  If you have any questions, ask your nurse or doctor.               Start taking these medicines.        Dose/Directions    predniSONE 20 MG tablet   Commonly known as:  DELTASONE   Used for:  Dermatitis   Started by:  Marie Larson PA-C        Dose:  20 mg   Take 1 tablet (20 mg) by mouth 2 times daily for 5 days   Quantity:  10 tablet   Refills:  1         These medicines have changed or have updated prescriptions.        Dose/Directions    * triamcinolone 0.1 % cream   Commonly known as:  KENALOG   This may have changed:  Another medication with the same name was added. Make sure you understand how and when to take each.   Used for:  Eczema   Changed by:  Marie Larson PA-C        Apply topically 3 times daily Apply to the back of the head   Quantity:  15 g   Refills:  0       * triamcinolone 0.5 % cream   Commonly known as:  KENALOG   This may have changed:  You were already taking a medication with the same name, and this prescription was added. Make sure you understand how and when to take each.   Used for:  Dermatitis   Changed by:  Marie Larson PA-C        Apply twice daily to rash on ears and neck   Quantity:  30 g   Refills:  0       * Notice:  This list has 2 medication(s) that are the same as other medications prescribed for you. Read the directions carefully, and ask your doctor or other care provider to review them with you.         Where to get your medicines      These medications were sent to [facility] [address] [facility]  [address] [facility] [address]     Phone:  [phone]     predniSONE 20 MG tablet    triamcinolone 0.5 % cream                 Primary Care Provider Office Phone # Fax #    Tere Griggs PA-C [phone] [address] [place] [address]        Equal Access to Services     ELENA SNOW : Carisa reyes barboza dilipo Socamille, wakirkda luqadaha, qaybta kaalmada linda, darlene liracindy campbell. So [facility] [phone].    ATENCIÓN: Si habla español, tiene a white disposición servicios gratuitos de asistencia lingüística. Llame al [phone].    We comply with applicable federal civil rights laws and [place] laws. We do not discriminate on the basis of race, color, national origin, age, disability, sex, sexual orientation, or gender identity.            Thank you!     Thank you for choosing [facility]  for your care. Our goal is always to provide you with excellent care. Hearing back from our patients is one way we can continue to improve our services. Please take a few minutes to complete the written survey that you may receive in the mail after your visit with us. Thank you!             Your Updated Medication List - Protect others around you: Learn how to safely use, store and throw away your medicines at www.disposemymeds.org.          This list is accurate as of 6/27/18  9:30 PM.  Always use your most recent med list.                   Brand Name Dispense Instructions for use Diagnosis    * ISOtretinoin 40 MG capsule    ACCUTANE    30 capsule    Take one tablet daily in the evening.    Acne vulgaris       * ISOtretinoin 20 MG capsule    ACCUTANE    30 capsule    Take one tablet daily in the morning.    Acne vulgaris       levonorgestrel-ethinyl estradiol 0.1-20 MG-MCG per tablet    AVIANE,ALESSE,LESSINA    84 tablet    Take 1 tablet by mouth daily    Encounter for initial prescription of contraceptive pills       predniSONE 20 MG tablet    DELTASONE    10 tablet    Take 1 tablet (20 mg) by mouth 2 times daily for 5 days    Dermatitis       * triamcinolone 0.1 % cream    KENALOG    15 g     Apply topically 3 times daily Apply to the back of the head    Eczema       * triamcinolone 0.5 % cream    KENALOG    30 g    Apply twice daily to rash on ears and neck    Dermatitis       * Notice:  This list has 4 medication(s) that are the same as other medications prescribed for you. Read the directions carefully, and ask your doctor or other care provider to review them with you.

## 2018-06-27 NOTE — LETTER
6/27/2018         RE: Vira Salgado  [address] [facility] [address]        Dear Colleague,    Thank you for referring your patient, Vira Salgado, to the [facility]. Please see a copy of my visit note below.    Vria Salgado is a 22 year old year old female patient here today for recheck acne vulgaris.  Patient has finished fifth month of isotretinoin at 60 mg daily. She reports that her rash is worsening on her ears and neck. She is just using moisturizers to treat. No mood changes.  Patient has no other skin complaints today.  Remainder of the HPI, Meds, PMH, Allergies, FH, and SH was reviewed in chart.    Pertinent Hx:   Acne Vulgaris   History reviewed. No pertinent past medical history.    Past Surgical History:   Procedure Laterality Date     NO HISTORY OF SURGERY          Family History   Problem Relation Age of Onset     Blood Disease Father      leukemia     Cerebrovascular Disease Father      Hypertension Mother      Thyroid Disease Mother      Diabetes Paternal Grandmother      Hypertension Paternal Grandmother      Asthma No family hx of      C.A.D. No family hx of      Breast Cancer No family hx of      Cancer - colorectal No family hx of      Prostate Cancer No family hx of        Social History     Social History     Marital status: Single     Spouse name: N/A     Number of children: 0     Years of education: 12+     Occupational History      Student     Social History Main Topics     Smoking status: Never Smoker     Smokeless tobacco: Never Used     Alcohol use No     Drug use: No     Sexual activity: No     Other Topics Concern     Parent/Sibling W/ Cabg, Mi Or Angioplasty Before 65f 55m? No     Social History Narrative       Outpatient Encounter Prescriptions as of 6/27/2018   Medication Sig Dispense Refill     ISOtretinoin (ACCUTANE) 20 MG capsule Take one tablet daily in the morning. 30 capsule 0     ISOtretinoin (ACCUTANE) 40 MG capsule Take one tablet daily in  the evening. 30 capsule 0     levonorgestrel-ethinyl estradiol (AVIANE,ALESSE,LESSINA) 0.1-20 MG-MCG per tablet Take 1 tablet by mouth daily 84 tablet 3     predniSONE (DELTASONE) 20 MG tablet Take 1 tablet (20 mg) by mouth 2 times daily for 5 days 10 tablet 1     triamcinolone (KENALOG) 0.1 % cream Apply topically 3 times daily Apply to the back of the head 15 g 0     triamcinolone (KENALOG) 0.5 % cream Apply twice daily to rash on ears and neck 30 g 0     No facility-administered encounter medications on file as of 6/27/2018.              Review Of Systems  Skin: As above  Eyes: negative  Ears/Nose/Throat: negative  Respiratory: No shortness of breath, dyspnea on exertion, cough, or hemoptysis  Cardiovascular: negative  Gastrointestinal: negative  Genitourinary: negative  Musculoskeletal: negative  Neurologic: negative  Psychiatric: negative  Hematologic/Lymphatic/Immunologic: negative  Endocrine: negative      O:   NAD, WDWN, Alert & Oriented, Mood & Affect wnl, Vitals stable   Here today alone   /75 (BP Location: Right arm, Patient Position: Sitting, Cuff Size: Adult Regular)  Pulse 82  SpO2 98%   General appearance normal   Vitals stable   Alert, oriented and in no acute distress     No visible inflammatory papules seen on face  Xerotic plaques on ears, and neck with weeping     Eyes: Conjunctivae/lids:Normal     ENT: Lips: normal    MSK:Normal    Pulm: Breathing Normal    Neuro/Psych: Orientation:Normal; Mood/Affect:Normal  A/P:  1. Acne Vulgaris- resolved with xerotic eczema   Take prednisone 20 mg twice daily for 5 days.   Apply triamcinolone cream twice daily as needed.   Finished with isotretinoin.   Current Dosage: 8400 mg   Goal dosage: 1690-4270 mg   Patient is abstinent.   Standing CBC, CMP and fasting lipids  Ipledge reviewed with patient and Ipledge consent form complete  Patient place in ipledge system  Ipledge:[id number]  Return to clinic 30 days  Dry lips and mouth, minor swelling of the  eyelids or lips, crusty skin, nosebleeds, GI upset, or thinning of hair may occur. If any of these effects persist or worsen, tell your doctor or pharmacist promptly.   To relieve dry mouth, suck on (sugarless) hard candy or ice chips, chew (sugarless) gum, drink water.   Remember that your doctor has prescribed this medication because he or she has judged that the benefit to you is greater than the risk of side effects. Many people using this medication do not have serious side effects.   Contact office immediately if you have any of these unlikely but serious side effects: mental/mood changes (e.g., depression,  aggressive or violent behavior, and in rare cases, thoughts of suicide), tingling feeling in the skin, quick/severe sun sensitivity, back/joint/muscle pain, signs of infection (e.g., fever, persistent sore throat, painful swallowing, peeling skin on palms/soles.   Isotretinoin may infrequently cause disease of the pancreatitis, that may rarely be fatal. Stop taking this medication and contact office immediately if you develop: severe stomach pain severe or persistent GI upset,   Stop taking this medication and tell your doctor immediately if you develop these unlikely but very serious side effects: severe headache, vision changes, ear ringing, hearling loss, chest pain, yellowing eyes, skin, dark urine, severe diarrhea, rectal bleeding,   Seek immediate medical attention if you notice any symptoms of a serious allergic reaction.      Accutane is discussed fully with the patient. It is a very effective drug to treat acne vulgaris but has many potential significant side effects. Chief among these are teratogensis, hepatic injury, dyslipidemia and severe drying of the mucous membranes. All of these issues have been discussed in details. Monthly blood tests to monitor lipids and liver functions will be necessary. Expect painful dryness and/or fissuring around the lips, eyes, and other moist areas of the body.  Balms may be protective. Contact lens may be too painful to wear temporarily while on this drug. Episodes of significant depression have been reported, including suicidal ideation and attempts in rare cases. It may also cause pseudotumor cerebri and hyperostosis. The patient will report any such changes in mood, depressive symptoms or suicidal thoughts, headaches, joint or bone pains. There is also a possible association with inflammatory bowel disease, although this is unproven at this point.     Again, thank you for allowing me to participate in the care of your patient.        Sincerely,        Marie Arellano PA-C

## 2018-06-27 NOTE — NURSING NOTE
"Initial /75 (BP Location: Right arm, Patient Position: Sitting, Cuff Size: Adult Regular)  Pulse 82  SpO2 98% Estimated body mass index is 21.24 kg/(m^2) as calculated from the following:    Height as of 4/5/18: 1.664 m (5' 5.5\").    Weight as of 4/5/18: 58.8 kg (129 lb 9.6 oz). .      "

## 2018-06-28 NOTE — PROGRESS NOTES
Vira Salgado is a 22 year old year old female patient here today for recheck acne vulgaris.  Patient has finished fifth month of isotretinoin at 60 mg daily. She reports that her rash is worsening on her ears and neck. She is just using moisturizers to treat. No mood changes.  Patient has no other skin complaints today.  Remainder of the HPI, Meds, PMH, Allergies, FH, and SH was reviewed in chart.    Pertinent Hx:   Acne Vulgaris   History reviewed. No pertinent past medical history.    Past Surgical History:   Procedure Laterality Date     NO HISTORY OF SURGERY          Family History   Problem Relation Age of Onset     Blood Disease Father      leukemia     Cerebrovascular Disease Father      Hypertension Mother      Thyroid Disease Mother      Diabetes Paternal Grandmother      Hypertension Paternal Grandmother      Asthma No family hx of      C.A.D. No family hx of      Breast Cancer No family hx of      Cancer - colorectal No family hx of      Prostate Cancer No family hx of        Social History     Social History     Marital status: Single     Spouse name: N/A     Number of children: 0     Years of education: 12+     Occupational History      Student     Social History Main Topics     Smoking status: Never Smoker     Smokeless tobacco: Never Used     Alcohol use No     Drug use: No     Sexual activity: No     Other Topics Concern     Parent/Sibling W/ Cabg, Mi Or Angioplasty Before 65f 55m? No     Social History Narrative       Outpatient Encounter Prescriptions as of 6/27/2018   Medication Sig Dispense Refill     ISOtretinoin (ACCUTANE) 20 MG capsule Take one tablet daily in the morning. 30 capsule 0     ISOtretinoin (ACCUTANE) 40 MG capsule Take one tablet daily in the evening. 30 capsule 0     levonorgestrel-ethinyl estradiol (AVIANE,ALESSE,LESSINA) 0.1-20 MG-MCG per tablet Take 1 tablet by mouth daily 84 tablet 3     predniSONE (DELTASONE) 20 MG tablet Take 1 tablet (20 mg) by mouth 2 times daily for 5  days 10 tablet 1     triamcinolone (KENALOG) 0.1 % cream Apply topically 3 times daily Apply to the back of the head 15 g 0     triamcinolone (KENALOG) 0.5 % cream Apply twice daily to rash on ears and neck 30 g 0     No facility-administered encounter medications on file as of 6/27/2018.              Review Of Systems  Skin: As above  Eyes: negative  Ears/Nose/Throat: negative  Respiratory: No shortness of breath, dyspnea on exertion, cough, or hemoptysis  Cardiovascular: negative  Gastrointestinal: negative  Genitourinary: negative  Musculoskeletal: negative  Neurologic: negative  Psychiatric: negative  Hematologic/Lymphatic/Immunologic: negative  Endocrine: negative      O:   NAD, WDWN, Alert & Oriented, Mood & Affect wnl, Vitals stable   Here today alone   /75 (BP Location: Right arm, Patient Position: Sitting, Cuff Size: Adult Regular)  Pulse 82  SpO2 98%   General appearance normal   Vitals stable   Alert, oriented and in no acute distress     No visible inflammatory papules seen on face  Xerotic plaques on ears, and neck with weeping     Eyes: Conjunctivae/lids:Normal     ENT: Lips: normal    MSK:Normal    Pulm: Breathing Normal    Neuro/Psych: Orientation:Normal; Mood/Affect:Normal  A/P:  1. Acne Vulgaris- resolved with xerotic eczema   Take prednisone 20 mg twice daily for 5 days.   Apply triamcinolone cream twice daily as needed.   Finished with isotretinoin.   Current Dosage: 8400 mg   Goal dosage: 7555-9557 mg   Patient is abstinent.   Standing CBC, CMP and fasting lipids  Ipledge reviewed with patient and Ipledge consent form complete  Patient place in ipledge system  Ipledge:[id number]  Return to clinic 30 days  Dry lips and mouth, minor swelling of the eyelids or lips, crusty skin, nosebleeds, GI upset, or thinning of hair may occur. If any of these effects persist or worsen, tell your doctor or pharmacist promptly.   To relieve dry mouth, suck on (sugarless) hard candy or ice chips, chew  (sugarless) gum, drink water.   Remember that your doctor has prescribed this medication because he or she has judged that the benefit to you is greater than the risk of side effects. Many people using this medication do not have serious side effects.   Contact office immediately if you have any of these unlikely but serious side effects: mental/mood changes (e.g., depression,  aggressive or violent behavior, and in rare cases, thoughts of suicide), tingling feeling in the skin, quick/severe sun sensitivity, back/joint/muscle pain, signs of infection (e.g., fever, persistent sore throat, painful swallowing, peeling skin on palms/soles.   Isotretinoin may infrequently cause disease of the pancreatitis, that may rarely be fatal. Stop taking this medication and contact office immediately if you develop: severe stomach pain severe or persistent GI upset,   Stop taking this medication and tell your doctor immediately if you develop these unlikely but very serious side effects: severe headache, vision changes, ear ringing, hearling loss, chest pain, yellowing eyes, skin, dark urine, severe diarrhea, rectal bleeding,   Seek immediate medical attention if you notice any symptoms of a serious allergic reaction.      Accutane is discussed fully with the patient. It is a very effective drug to treat acne vulgaris but has many potential significant side effects. Chief among these are teratogensis, hepatic injury, dyslipidemia and severe drying of the mucous membranes. All of these issues have been discussed in details. Monthly blood tests to monitor lipids and liver functions will be necessary. Expect painful dryness and/or fissuring around the lips, eyes, and other moist areas of the body. Balms may be protective. Contact lens may be too painful to wear temporarily while on this drug. Episodes of significant depression have been reported, including suicidal ideation and attempts in rare cases. It may also cause pseudotumor  cerebri and hyperostosis. The patient will report any such changes in mood, depressive symptoms or suicidal thoughts, headaches, joint or bone pains. There is also a possible association with inflammatory bowel disease, although this is unproven at this point.

## 2018-07-16 DIAGNOSIS — Z30.011 ENCOUNTER FOR INITIAL PRESCRIPTION OF CONTRACEPTIVE PILLS: ICD-10-CM

## 2018-07-16 RX ORDER — LEVONORGESTREL/ETHIN.ESTRADIOL 0.1-0.02MG
TABLET ORAL
Qty: 84 TABLET | Refills: 3 | Status: CANCELLED | OUTPATIENT
Start: 2018-07-16

## 2018-07-24 ENCOUNTER — OFFICE VISIT (OUTPATIENT)
Dept: FAMILY MEDICINE | Facility: CLINIC | Age: 23
End: 2018-07-24
Payer: COMMERCIAL

## 2018-07-24 VITALS
WEIGHT: 131 LBS | TEMPERATURE: 98 F | SYSTOLIC BLOOD PRESSURE: 110 MMHG | DIASTOLIC BLOOD PRESSURE: 71 MMHG | HEIGHT: 66 IN | BODY MASS INDEX: 21.05 KG/M2 | HEART RATE: 58 BPM

## 2018-07-24 DIAGNOSIS — L70.0 ACNE VULGARIS: ICD-10-CM

## 2018-07-24 DIAGNOSIS — Z30.41 ENCOUNTER FOR SURVEILLANCE OF CONTRACEPTIVE PILLS: Primary | ICD-10-CM

## 2018-07-24 LAB
ALBUMIN SERPL-MCNC: 3.8 G/DL (ref 3.4–5)
ALP SERPL-CCNC: 67 U/L (ref 40–150)
ALT SERPL W P-5'-P-CCNC: 13 U/L (ref 0–50)
ANION GAP SERPL CALCULATED.3IONS-SCNC: 7 MMOL/L (ref 3–14)
AST SERPL W P-5'-P-CCNC: 18 U/L (ref 0–45)
BETA HCG QUAL IFA URINE: NEGATIVE
BILIRUB SERPL-MCNC: 1.3 MG/DL (ref 0.2–1.3)
BUN SERPL-MCNC: 16 MG/DL (ref 7–30)
CALCIUM SERPL-MCNC: 8.8 MG/DL (ref 8.5–10.1)
CHLORIDE SERPL-SCNC: 105 MMOL/L (ref 94–109)
CHOLEST SERPL-MCNC: 191 MG/DL
CO2 SERPL-SCNC: 25 MMOL/L (ref 20–32)
CREAT SERPL-MCNC: 0.95 MG/DL (ref 0.52–1.04)
ERYTHROCYTE [DISTWIDTH] IN BLOOD BY AUTOMATED COUNT: 12.7 % (ref 10–15)
GFR SERPL CREATININE-BSD FRML MDRD: 71 ML/MIN/1.7M2
GLUCOSE SERPL-MCNC: 85 MG/DL (ref 70–99)
HCT VFR BLD AUTO: 43.7 % (ref 35–47)
HDLC SERPL-MCNC: 56 MG/DL
HGB BLD-MCNC: 14.1 G/DL (ref 11.7–15.7)
LDLC SERPL CALC-MCNC: 116 MG/DL
MCH RBC QN AUTO: 29.2 PG (ref 26.5–33)
MCHC RBC AUTO-ENTMCNC: 32.3 G/DL (ref 31.5–36.5)
MCV RBC AUTO: 91 FL (ref 78–100)
NONHDLC SERPL-MCNC: 135 MG/DL
PLATELET # BLD AUTO: 249 10E9/L (ref 150–450)
POTASSIUM SERPL-SCNC: 4.1 MMOL/L (ref 3.4–5.3)
PROT SERPL-MCNC: 7.5 G/DL (ref 6.8–8.8)
RBC # BLD AUTO: 4.83 10E12/L (ref 3.8–5.2)
SODIUM SERPL-SCNC: 137 MMOL/L (ref 133–144)
TRIGL SERPL-MCNC: 95 MG/DL
WBC # BLD AUTO: 7 10E9/L (ref 4–11)

## 2018-07-24 PROCEDURE — 84703 CHORIONIC GONADOTROPIN ASSAY: CPT | Performed by: PHYSICIAN ASSISTANT

## 2018-07-24 PROCEDURE — 80053 COMPREHEN METABOLIC PANEL: CPT | Performed by: PHYSICIAN ASSISTANT

## 2018-07-24 PROCEDURE — 85027 COMPLETE CBC AUTOMATED: CPT | Performed by: PHYSICIAN ASSISTANT

## 2018-07-24 PROCEDURE — 99213 OFFICE O/P EST LOW 20 MIN: CPT | Performed by: PHYSICIAN ASSISTANT

## 2018-07-24 PROCEDURE — 36415 COLL VENOUS BLD VENIPUNCTURE: CPT | Performed by: PHYSICIAN ASSISTANT

## 2018-07-24 PROCEDURE — 80061 LIPID PANEL: CPT | Performed by: PHYSICIAN ASSISTANT

## 2018-07-24 NOTE — PROGRESS NOTES
"  SUBJECTIVE:   Vira Salgado is a 22 year old female who presents to clinic today for the following health issues:      Medication Followup of Birth Control    Taking Medication as prescribed: yes    Side Effects:  None    Medication Helping Symptoms:  yes       Here for follow up of birth control  Getting  on Friday  Has not been sexually active ever  Due for PAP but was told she could wait until after she was - plans to come back in the fall  No issues with breakthrough bleeding or spotting      Problem list and histories reviewed & adjusted, as indicated.  Additional history: as documented    Current Outpatient Prescriptions   Medication Sig Dispense Refill     levonorgestrel-ethinyl estradiol (AVIANE,ALESSE,LESSINA) 0.1-20 MG-MCG per tablet Take 1 tablet by mouth daily 84 tablet 3     Allergies   Allergen Reactions     Penicillins Hives     BP Readings from Last 3 Encounters:   07/24/18 110/71   06/27/18 118/75   05/24/18 110/68    Wt Readings from Last 3 Encounters:   07/24/18 131 lb (59.4 kg)   04/05/18 129 lb 9.6 oz (58.8 kg)   12/28/17 130 lb (59 kg)                    Reviewed and updated as needed this visit by clinical staff       Reviewed and updated as needed this visit by Provider         ROS:  Remainder of ROS obtained and found to be negative other than that which was documented above      OBJECTIVE:     /71  Pulse 58  Temp 98  F (36.7  C) (Tympanic)  Ht 5' 5.5\" (1.664 m)  Wt 131 lb (59.4 kg)  BMI 21.47 kg/m2  Body mass index is 21.47 kg/(m^2).  GENERAL: healthy, alert and no distress  RESP: lungs clear to auscultation - no rales, rhonchi or wheezes  CV: regular rate and rhythm, normal S1 S2, no S3 or S4, no murmur, click or rub, no peripheral edema and peripheral pulses strong  ABDOMEN: soft, nontender, no hepatosplenomegaly, no masses and bowel sounds normal  MS: no gross musculoskeletal defects noted, no edema  SKIN: no suspicious lesions or rashes  NEURO: Normal strength " and tone, mentation intact and speech normal  PSYCH: mentation appears normal, affect normal/bright    Diagnostic Test Results:  none     ASSESSMENT/PLAN:       ICD-10-CM    1. Encounter for surveillance of contraceptive pills Z30.41      Refilled previously  Doing well - no concerns  Due for PAP by age but has not been sexually active. Getting  this week. Will return in fall/winter for first pap      Obdulia Collier PA-C  [facility]

## 2018-07-24 NOTE — MR AVS SNAPSHOT
"              After Visit Summary   7/24/2018    Vira Salgado    MRN: [id number]           Patient Information     Date Of Birth          1995        Visit Information        Provider Department      7/24/2018 8:20 AM Obdulia Collier PA-C [facility]        Today's Diagnoses     Encounter for surveillance of contraceptive pills    -  1       Follow-ups after your visit        Who to contact     Normal or non-critical lab and imaging results will be communicated to you by Star[employer], letter or phone within 4 business days after the clinic has received the results. If you do not hear from us within 7 days, please contact the clinic through StarGen[employer] or phone. If you have a critical or abnormal lab result, we will notify you by phone as soon as possible.  Submit refill requests through [email or web address] or call your pharmacy and they will forward the refill request to us. Please allow 3 business days for your refill to be completed.          If you need to speak with a  for additional information , please call: [phone]             Additional Information About Your Visit        [employer] Information     [email or web address] gives you secure access to your electronic health record. If you see a primary care provider, you can also send messages to your care team and make appointments. If you have questions, please call your primary care clinic.  If you do not have a primary care provider, please call [phone] and they will assist you.        Care EveryWhere ID     This is your Care EveryWhere ID. This could be used by other organizations to access your [place] medical records  [id number]        Your Vitals Were     Pulse Temperature Height BMI (Body Mass Index)          58 98  F (36.7  C) (Tympanic) 5' 5.5\" (1.664 m) 21.47 kg/m2         Blood Pressure from Last 3 Encounters:   07/24/18 110/71   06/27/18 118/75   05/24/18 110/68    Weight from Last 3 Encounters:   07/24/18 131 lb (59.4 kg) "   04/05/18 129 lb 9.6 oz (58.8 kg)   12/28/17 130 lb (59 kg)              Today, you had the following     No orders found for display       Primary Care Provider Office Phone # Fax #    Tere Griggs PA-C [phone] [address] [facility] [address]        Equal Access to Services     [facility]: Hadii aad ku hadasho Soomaali, waaxda luqadaha, qaybta kaalmada adeegyada, waxay idiin hayaan adeeg khclarissash la'aan ah. So [facility] [phone].    ATENCIÓN: Si habla espjagdish, tiene a white disposición servicios gratuitos de asistencia lingüística. [facility] [phone].    We comply with applicable federal civil rights laws and [place] laws. We do not discriminate on the basis of race, color, national origin, age, disability, sex, sexual orientation, or gender identity.            Thank you!     Thank you for choosing [facility]  for your care. Our goal is always to provide you with excellent care. Hearing back from our patients is one way we can continue to improve our services. Please take a few minutes to complete the written survey that you may receive in the mail after your visit with us. Thank you!             Your Updated Medication List - Protect others around you: Learn how to safely use, store and throw away your medicines at www.disposemymeds.org.          This list is accurate as of 7/24/18  9:25 AM.  Always use your most recent med list.                   Brand Name Dispense Instructions for use Diagnosis    levonorgestrel-ethinyl estradiol 0.1-20 MG-MCG per tablet    AVIANE,ALESSE,LESSINA    84 tablet    Take 1 tablet by mouth daily    Encounter for initial prescription of contraceptive pills

## 2018-08-03 ENCOUNTER — TELEPHONE (OUTPATIENT)
Dept: FAMILY MEDICINE | Facility: CLINIC | Age: 23
End: 2018-08-03

## 2018-08-03 NOTE — TELEPHONE ENCOUNTER
Panel Management Review      Patient has the following on her problem list: None      Composite cancer screening  Chart review shows that this patient is due/due soon for the following Pap Smear  Summary:    Patient is due/failing the following:   PAP and PHYSICAL    Action needed:   Patient needs office visit for a pap.    Type of outreach:    Phone, left message for patient to call back.     Questions for provider review:    None                                                                                                                                    Michael Huang CMA       Chart routed to self.

## 2018-10-01 ENCOUNTER — MYC REFILL (OUTPATIENT)
Dept: FAMILY MEDICINE | Facility: CLINIC | Age: 23
End: 2018-10-01

## 2018-10-01 DIAGNOSIS — Z30.011 ENCOUNTER FOR INITIAL PRESCRIPTION OF CONTRACEPTIVE PILLS: ICD-10-CM

## 2018-10-02 RX ORDER — LEVONORGESTREL/ETHIN.ESTRADIOL 0.1-0.02MG
1 TABLET ORAL DAILY
Qty: 84 TABLET | Refills: 2 | Status: SHIPPED | OUTPATIENT
Start: 2018-10-02 | End: 2019-06-21

## 2018-10-02 NOTE — TELEPHONE ENCOUNTER
Message from [employer]:  Original authorizing provider: Obdulia Collier PA-C    Vira Salgado would like a refill of the following medications:  levonorgestrel-ethinyl estradiol (KRZYSZTOF CHILDS,LESSINA) 0.1-20 MG-MCG per tablet [Obdulia Collier PA-C]    Preferred pharmacy: [facility] [address] [facility]    Comment:

## 2018-11-12 ASSESSMENT — ENCOUNTER SYMPTOMS
HEARTBURN: 0
HEADACHES: 0
SHORTNESS OF BREATH: 0
DIARRHEA: 0
HEMATURIA: 0
MYALGIAS: 0
COUGH: 0
HEMATOCHEZIA: 0
EYE PAIN: 0
WEAKNESS: 0
DYSURIA: 0
BREAST MASS: 0
ABDOMINAL PAIN: 0
NERVOUS/ANXIOUS: 0
JOINT SWELLING: 0
SORE THROAT: 0
PARESTHESIAS: 0
CHILLS: 0
FEVER: 0
NAUSEA: 0
ARTHRALGIAS: 0
DIZZINESS: 0
FREQUENCY: 0
CONSTIPATION: 0
PALPITATIONS: 0

## 2018-11-19 ENCOUNTER — OFFICE VISIT (OUTPATIENT)
Dept: FAMILY MEDICINE | Facility: CLINIC | Age: 23
End: 2018-11-19
Payer: COMMERCIAL

## 2018-11-19 VITALS
BODY MASS INDEX: 23.3 KG/M2 | TEMPERATURE: 98 F | SYSTOLIC BLOOD PRESSURE: 120 MMHG | WEIGHT: 145 LBS | HEIGHT: 66 IN | DIASTOLIC BLOOD PRESSURE: 64 MMHG

## 2018-11-19 DIAGNOSIS — Z23 NEED FOR PROPHYLACTIC VACCINATION AND INOCULATION AGAINST INFLUENZA: ICD-10-CM

## 2018-11-19 DIAGNOSIS — Z00.00 ENCOUNTER FOR ROUTINE ADULT HEALTH EXAMINATION WITHOUT ABNORMAL FINDINGS: Primary | ICD-10-CM

## 2018-11-19 DIAGNOSIS — Z12.4 ENCOUNTER FOR SCREENING FOR CERVICAL CANCER: ICD-10-CM

## 2018-11-19 PROCEDURE — 90686 IIV4 VACC NO PRSV 0.5 ML IM: CPT | Performed by: PHYSICIAN ASSISTANT

## 2018-11-19 PROCEDURE — G0145 SCR C/V CYTO,THINLAYER,RESCR: HCPCS | Performed by: PHYSICIAN ASSISTANT

## 2018-11-19 PROCEDURE — 90471 IMMUNIZATION ADMIN: CPT | Performed by: PHYSICIAN ASSISTANT

## 2018-11-19 PROCEDURE — 99395 PREV VISIT EST AGE 18-39: CPT | Mod: 25 | Performed by: PHYSICIAN ASSISTANT

## 2018-11-19 ASSESSMENT — PAIN SCALES - GENERAL: PAINLEVEL: NO PAIN (0)

## 2018-11-19 NOTE — PROGRESS NOTES

## 2018-11-19 NOTE — PROGRESS NOTES
SUBJECTIVE:   CC: Vira Gilmore is an 23 year old woman who presents for preventive health visit.     [facility] for HPI/ROS submitted by the patient on 11/12/2018   Annual Exam:  Frequency of exercise:: 2-3 days/week  Getting at least 3 servings of Calcium per day:: NO  Diet:: Regular (no restrictions)  Taking medications regularly:: Yes  Medication side effects:: None  Bi-annual eye exam:: Yes  Dental care twice a year:: Yes  Sleep apnea or symptoms of sleep apnea:: None  abdominal pain: No  Blood in stool: No  Blood in urine: No  chest pain: No  chills: No  congestion: No  constipation: No  cough: No  diarrhea: No  dizziness: No  ear pain: No  eye pain: No  nervous/anxious: No  fever: No  frequency: No  genital sores: No  headaches: No  hearing loss: No  heartburn: No  arthralgias: No  joint swelling: No  peripheral edema: No  mood changes: No  myalgias: No  nausea: No  dysuria: No  palpitations: No  Skin sensation changes: No  sore throat: No  urgency: No  rash: No  shortness of breath: No  visual disturbance: No  weakness: No  pelvic pain: No  vaginal bleeding: No  vaginal discharge: No  tenderness: No  breast mass: No  breast discharge: No  Additional concerns today:: No  PHQ-2 Score: 0  Duration of exercise:: 15-30 minutes        Today's PHQ-2 Score:   PHQ-2 ( 1999 Pf[employer]) 11/12/2018 7/24/2018   Q1: Little interest or pleasure in doing things 0 0   Q2: Feeling down, depressed or hopeless 0 0   PHQ-2 Score 0 0   Q1: Little interest or pleasure in doing things Not at all -   Q2: Feeling down, depressed or hopeless Not at all -   PHQ-2 Score 0 -       Abuse: Current or Past(Physical, Sexual or Emotional)- No  Do you feel safe in your environment - Yes    Social History   Substance Use Topics     Smoking status: Never Smoker     Smokeless tobacco: Never Used     Alcohol use No     If you drink alcohol do you typically have >3 drinks per day or >7 drinks per week? No                    "  Reviewed orders with patient.  Reviewed health maintenance and updated orders accordingly - Yes  BP Readings from Last 3 Encounters:   11/19/18 120/64   07/24/18 110/71   06/27/18 118/75    Wt Readings from Last 3 Encounters:   11/19/18 145 lb (65.8 kg)   07/24/18 131 lb (59.4 kg)   04/05/18 129 lb 9.6 oz (58.8 kg)                  Current Outpatient Prescriptions   Medication Sig Dispense Refill     levonorgestrel-ethinyl estradiol (AVIANE,ALESSE,LESSINA) 0.1-20 MG-MCG per tablet Take 1 tablet by mouth daily 84 tablet 2     Allergies   Allergen Reactions     Penicillins Hives       Mammogram not appropriate for this patient based on age.    Pertinent mammograms are reviewed under the imaging tab.  History of abnormal Pap smear: NO - age 21-29 PAP every 3 years recommended     Reviewed and updated as needed this visit by clinical staff         Reviewed and updated as needed this visit by Provider            ROS:  CONSTITUTIONAL: NEGATIVE for fever, chills, change in weight  INTEGUMENTARU/SKIN: NEGATIVE for worrisome rashes, moles or lesions  EYES: NEGATIVE for vision changes or irritation  ENT: NEGATIVE for ear, mouth and throat problems  RESP: NEGATIVE for significant cough or SOB  BREAST: NEGATIVE for masses, tenderness or discharge  CV: NEGATIVE for chest pain, palpitations or peripheral edema  GI: NEGATIVE for nausea, abdominal pain, heartburn, or change in bowel habits  : NEGATIVE for unusual urinary or vaginal symptoms. Periods are regular.  MUSCULOSKELETAL: NEGATIVE for significant arthralgias or myalgia  NEURO: NEGATIVE for weakness, dizziness or paresthesias  PSYCHIATRIC: NEGATIVE for changes in mood or affect    OBJECTIVE:   /64  Temp 98  F (36.7  C) (Tympanic)  Ht 5' 5.5\" (1.664 m)  Wt 145 lb (65.8 kg)  BMI 23.76 kg/m2  EXAM:  GENERAL: healthy, alert and no distress  EYES: Eyes grossly normal to inspection, PERRL and conjunctivae and sclerae normal  HENT: ear canals and TM's normal, nose and " "mouth without ulcers or lesions  NECK: no adenopathy, no asymmetry, masses, or scars and thyroid normal to palpation  RESP: lungs clear to auscultation - no rales, rhonchi or wheezes  BREAST: normal without masses, tenderness or nipple discharge and no palpable axillary masses or adenopathy  CV: regular rate and rhythm, normal S1 S2, no S3 or S4, no murmur, click or rub, no peripheral edema and peripheral pulses strong  ABDOMEN: soft, nontender, no hepatosplenomegaly, no masses and bowel sounds normal   (female): normal female external genitalia, normal urethral meatus, vaginal mucosa pink, moist, well rugated, and normal cervix/adnexa/uterus without masses or discharge  MS: no gross musculoskeletal defects noted, no edema  SKIN: no suspicious lesions or rashes  NEURO: Normal strength and tone, mentation intact and speech normal  PSYCH: mentation appears normal, affect normal/bright    Diagnostic Test Results:  none     ASSESSMENT/PLAN:       ICD-10-CM    1. Encounter for routine adult health examination without abnormal findings Z00.00 Pap imaged thin layer screen reflex to HPV if ASCUS - recommend age 25 - 29   2. Encounter for screening for cervical cancer  Z12.4 Pap imaged thin layer screen reflex to HPV if ASCUS - recommend age 25 - 29   3. Need for prophylactic vaccination and inoculation against influenza Z23 FLU VACCINE, SPLIT VIRUS, IM (QUADRIVALENT) [10654]- >3 YRS     Vaccine Administration, Initial [66189]       COUNSELING:   Reviewed preventive health counseling, as reflected in patient instructions       Regular exercise       Healthy diet/nutrition    BP Readings from Last 1 Encounters:   07/24/18 110/71     Estimated body mass index is 21.47 kg/(m^2) as calculated from the following:    Height as of 7/24/18: 5' 5.5\" (1.664 m).    Weight as of 7/24/18: 131 lb (59.4 kg).           reports that she has never smoked. She has never used smokeless tobacco.      Counseling Resources:  ATP IV " Guidelines  Pooled Cohorts Equation Calculator  Breast Cancer Risk Calculator  FRAX Risk Assessment  ICSI Preventive Guidelines  Dietary Guidelines for Americans, 2010  Elli's MyPlate  ASA Prophylaxis  Lung CA Screening    Obdulia Collier PA-C  pap

## 2018-11-19 NOTE — MR AVS SNAPSHOT
After Visit Summary   11/19/2018    Vira Gilmore    MRN: [id number]           Patient Information     Date Of Birth          1995        Visit Information        Provider Department      11/19/2018 4:40 PM Obdulia Collier PA-C [facility]        Today's Diagnoses     Encounter for screening for cervical cancer     -  1      Care Instructions      Preventive Health Recommendations  Female Ages 21 to 25     Yearly exam:     See your health care provider every year in order to  o Review health changes.   o Discuss preventive care.    o Review your medicines if your doctor has prescribed any.      You should be tested each year for STDs (sexually transmitted diseases).       Talk to your provider about how often you should have cholesterol testing.      Get a Pap test every three years. If you have an abnormal result, your doctor may have you test more often.      If you are at risk for diabetes, you should have a diabetes test (fasting glucose).     Shots:     Get a flu shot each year.     Get a tetanus shot every 10 years.     Consider getting the shot (vaccine) that prevents cervical cancer (Gardasil).    Nutrition:     Eat at least 5 servings of fruits and vegetables each day.    Eat whole-grain bread, whole-wheat pasta and brown rice instead of white grains and rice.    Get adequate Calcium and Vitamin D.     Lifestyle    Exercise at least 150 minutes a week each week (30 minutes a day, 5 days a week). This will help you control your weight and prevent disease.    Limit alcohol to one drink per day.    No smoking.     Wear sunscreen to prevent skin cancer.    See your dentist every six months for an exam and cleaning.          Follow-ups after your visit        Your next 10 appointments already scheduled     Nov 19, 2018  4:40 PM DAWNA [facility] Physical Adult with Obdulia Collier PA-C   [facility] ([facility]    [id number]  "[place] [id number] [phone]              Who to contact     Normal or non-critical lab and imaging results will be communicated to you by [employer], letter or phone within 4 business days after the clinic has received the results. If you do not hear from us within 7 days, please contact the clinic through MyChart or phone. If you have a critical or abnormal lab result, we will notify you by phone as soon as possible.  Submit refill requests through [employer] or call your pharmacy and they will forward the refill request to us. Please allow 3 business days for your refill to be completed.          If you need to speak with a  for additional information , please call: [phone]             Additional Information About Your Visit        [employer]o Information     [employer] gives you secure access to your electronic health record. If you see a primary care provider, you can also send messages to your care team and make appointments. If you have questions, please call your primary care clinic.  If you do not have a primary care provider, please call [phone] and they will assist you.        Care EveryWhere ID     This is your Care EveryWhere ID. This could be used by other organizations to access your [facility] medical records  [id number]        Your Vitals Were     Temperature Height BMI (Body Mass Index)             98  F (36.7  C) (Tympanic) 5' 5.5\" (1.664 m) 23.76 kg/m2          Blood Pressure from Last 3 Encounters:   11/19/18 120/64   07/24/18 110/71   06/27/18 118/75    Weight from Last 3 Encounters:   11/19/18 145 lb (65.8 kg)   07/24/18 131 lb (59.4 kg)   04/05/18 129 lb 9.6 oz (58.8 kg)              We Performed the Following     Pap imaged thin layer screen reflex to HPV if ASCUS - recommend age 25 - 29        Primary Care Provider Office Phone # Fax #    Tere Griggs PA-C [phone] [address] [place] [id number]        Equal Access to Services  "    ELENA SNOW : Hadii aad ku dena Bates, wakirkda luqadaha, qaybta kaalmada linda, darlene joni lubasepideh escobarclarissabucky marks . So [facility] [phone].    ATENCIÓN: Si habla español, tiene a white disposición servicios gratuitos de asistencia lingüística. Llame al [phone].    We comply with applicable federal civil rights laws and [place] laws. We do not discriminate on the basis of race, color, national origin, age, disability, sex, sexual orientation, or gender identity.            Thank you!     Thank you for choosing [facility]  for your care. Our goal is always to provide you with excellent care. Hearing back from our patients is one way we can continue to improve our services. Please take a few minutes to complete the written survey that you may receive in the mail after your visit with us. Thank you!             Your Updated Medication List - Protect others around you: Learn how to safely use, store and throw away your medicines at www.disposemymeds.org.          This list is accurate as of 11/19/18  4:39 PM.  Always use your most recent med list.                   Brand Name Dispense Instructions for use Diagnosis    levonorgestrel-ethinyl estradiol 0.1-20 MG-MCG per tablet    AVIANE,ALESSE,LESSINA    84 tablet    Take 1 tablet by mouth daily    Encounter for initial prescription of contraceptive pills

## 2018-11-21 LAB
COPATH REPORT: NORMAL
PAP: NORMAL

## 2019-03-28 ENCOUNTER — OFFICE VISIT (OUTPATIENT)
Dept: FAMILY MEDICINE | Facility: CLINIC | Age: 24
End: 2019-03-28
Payer: COMMERCIAL

## 2019-03-28 VITALS
TEMPERATURE: 98.9 F | DIASTOLIC BLOOD PRESSURE: 74 MMHG | SYSTOLIC BLOOD PRESSURE: 120 MMHG | HEART RATE: 96 BPM | RESPIRATION RATE: 12 BRPM | WEIGHT: 143.2 LBS | BODY MASS INDEX: 23.47 KG/M2

## 2019-03-28 DIAGNOSIS — R10.84 ABDOMINAL PAIN, GENERALIZED: Primary | ICD-10-CM

## 2019-03-28 PROCEDURE — 99213 OFFICE O/P EST LOW 20 MIN: CPT | Performed by: NURSE PRACTITIONER

## 2019-03-28 ASSESSMENT — PAIN SCALES - GENERAL: PAINLEVEL: NO PAIN (0)

## 2019-03-28 NOTE — NURSING NOTE
"Initial /74 (BP Location: Left arm, Patient Position: Chair, Cuff Size: Adult Regular)   Pulse 96   Temp 98.9  F (37.2  C) (Tympanic)   Resp 12   Wt 65 kg (143 lb 3.2 oz)   LMP 02/26/2019 (Exact Date)   BMI 23.47 kg/m   Estimated body mass index is 23.47 kg/m  as calculated from the following:    Height as of 11/19/18: 1.664 m (5' 5.5\").    Weight as of this encounter: 65 kg (143 lb 3.2 oz). .    Annia Reeves CMA ([facility])    "

## 2019-03-28 NOTE — PATIENT INSTRUCTIONS
Try to eliminate the dairy since you have some issues with dairy .     It is sounding like the diet was a little too abrupt .      Get back to your diet.  But introduce the vegetables more  Gradually         Recheck your pregnancy test since your period hasn't come     You can try Beano - when -eating  Gas producing  Food.

## 2019-03-28 NOTE — PROGRESS NOTES
SUBJECTIVE:   Vira Gilmore is a 23 year old female who presents to clinic today for the following health issues:    ABDOMINAL PAIN     Onset: 2-3 weeks, intermittent - happens every few days or so and lasts for about 20 minutes. Last episode was on Monday.    Description:   Character: Sharp, cramping  Location: started in epigastric area and will migrate into lower mid pelvic region, pain is diffuse and deep    Intensity: mild, moderate    Progression of Symptoms:  Worsening in intensity    Accompanying Signs & Symptoms:  Fever/Chills?: no, but will get very hot with the cramping   Gas/Bloating: YES   Nausea: no   Vomitting: no   Diarrhea?: YES  Constipation:no   Dysuria or Hematuria: no    History:   Trauma: no   Previous similar pain: no    Previous tests done: none    Precipitating factors:   Does the pain change with:     Food: maybe, hard to tell after starting the Whole 30 Diet    BM: YES- some relief    Urination: no     Alleviating factors:  None    Therapies Tried and outcome: Had been doing the whole 30 diet - stopped diet with no improvement, eliminating dairy - no improvement    LMP:  02/26/19 - has had some spotting over the past week, did take a pregnancy test on Tuesday and it was negative     Pt has always felt that she has always been sensitive to certain foods, especially dairy. She started the Whole 30 Diet on 3/1 to try to see if eliminating more foods will make her feel better but within two weeks she was having cramping and bloating symptoms so she stopped on Friday (3/22). She has also had spotting when wiping this month; she should have gotten her period with the placebo pills on Tues or Wed but has not gotten it yet.   Recently traveled to [place] but no recent international travel.   She has a stressful job but it has not worsened in the last two weeks.    Diet: usually does not eat breakfast except for 16oz of coffee; lunch is leftovers or a salad; dinner is meat, potato,  vegetable; snacks on almonds, granola bars, Marilynn bars, fruit      Problem list and histories reviewed & adjusted, as indicated.  Additional history: as documented    Patient Active Problem List   Diagnosis     health care home     CARDIOVASCULAR SCREENING; LDL GOAL LESS THAN 160     Molluscum contagiosum     Acne     Past Surgical History:   Procedure Laterality Date     NO HISTORY OF SURGERY         Social History     Tobacco Use     Smoking status: Never Smoker     Smokeless tobacco: Never Used   Substance Use Topics     Alcohol use: Yes     Comment: Not very common. Occasionally     Family History   Problem Relation Age of Onset     Blood Disease Father         leukemia     Cerebrovascular Disease Father         TIA     Diabetes Father      Other Cancer Father         Lukemia     Hypertension Mother      Thyroid Disease Mother      Obesity Mother      Diabetes Paternal Grandmother      Hypertension Paternal Grandmother      Cerebrovascular Disease Maternal Grandmother      Thyroid Disease Maternal Grandfather      Asthma No family hx of      C.A.D. No family hx of      Breast Cancer No family hx of      Cancer - colorectal No family hx of      Prostate Cancer No family hx of          Current Outpatient Medications   Medication Sig Dispense Refill     levonorgestrel-ethinyl estradiol (AVIANE,ALESSE,LESSINA) 0.1-20 MG-MCG per tablet Take 1 tablet by mouth daily 84 tablet 2     Allergies   Allergen Reactions     Penicillins Hives     BP Readings from Last 3 Encounters:   03/28/19 120/74   11/19/18 120/64   07/24/18 110/71    Wt Readings from Last 3 Encounters:   03/28/19 65 kg (143 lb 3.2 oz)   11/19/18 65.8 kg (145 lb)   07/24/18 59.4 kg (131 lb)                    Reviewed and updated as needed this visit by clinical staff       Reviewed and updated as needed this visit by Provider         ROS:  CONSTITUTIONAL: NEGATIVE for fever, chills, change in weight apart from slow weight gain since getting  in  July  INTEGUMENTARY/SKIN: NEGATIVE for worrisome rashes, moles or lesions  RESP: NEGATIVE for significant cough or SOB  CV: NEGATIVE for chest pain, palpitations or peripheral edema  GI: POSITIVE for abdominal symptoms as listed above- having BMs 1-2x/day (normal is 1x/day or 1x every other day), denies mucus or blood in stool, painful stooling  : POSITIVE for spotting, wiping brown or red with clots after urinating or having a bowel movement. She has not missed a birth control pill and her and her  use condoms as well. Pregnancy test was negative on Tuesday (3/26).    OBJECTIVE:     /74 (BP Location: Left arm, Patient Position: Chair, Cuff Size: Adult Regular)   Pulse 96   Temp 98.9  F (37.2  C) (Tympanic)   Resp 12   Wt 65 kg (143 lb 3.2 oz)   LMP 02/26/2019 (Exact Date)   BMI 23.47 kg/m    Body mass index is 23.47 kg/m .  GENERAL: healthy, alert and no distress  RESP: lungs clear to auscultation - no rales, rhonchi or wheezes  CV: regular rate and rhythm, normal S1 S2, no S3 or S4, no murmur, click or rub, no peripheral edema   ABDOMEN: soft, no hepatosplenomegaly, no masses and bowel sounds normal; POSITIVE for tenderness to palpation across lower abdomen    Diagnostic Test Results:  none     ASSESSMENT/PLAN:     ASSESSMENT/PLAN:      ICD-10-CM    1. Abdominal pain, generalized R10.84        Patient Instructions   Try to eliminate the dairy since you have some issues with dairy .     It is sounding like the diet was a little too abrupt .      Get back to your diet.  But introduce the vegetables more  Gradually         Recheck your pregnancy test since your period hasn't come     You can try Beano - when -eating  Gas producing  Food.            Note written by Palak Garcia NP student, under supervision of Yoli Santiago NP.     YOLI SANTIAGO NP, APRN [facility]

## 2019-06-21 DIAGNOSIS — Z30.011 ENCOUNTER FOR INITIAL PRESCRIPTION OF CONTRACEPTIVE PILLS: ICD-10-CM

## 2019-06-21 RX ORDER — LEVONORGESTREL AND ETHINYL ESTRADIOL 0.1-0.02MG
KIT ORAL
Qty: 84 TABLET | Refills: 2 | Status: SHIPPED | OUTPATIENT
Start: 2019-06-21 | End: 2021-01-18

## 2019-06-21 NOTE — TELEPHONE ENCOUNTER
Routing refill request to provider for review/approval because:  Drug not active on patient's medication list  Nieves Moreira RN

## 2019-06-21 NOTE — TELEPHONE ENCOUNTER
"Requested Prescriptions   Pending Prescriptions Disp Refills     FALMINA 0.1-20 MG-MCG tablet [Pharmacy Med Name: FALMINA 0.1-20MG-MCG TABS]  Last Written Prescription Date:  10/2/18  Last Fill Quantity: 84,  # refills: 2   Last office visit: 3/28/2019 with prescribing provider:  lester   Future Office Visit:     84 tablet 2     Sig: TAKE ONE TABLET BY MOUTH EVERY DAY       Contraceptives Protocol Passed - 6/21/2019  7:31 AM        Passed - Patient is not a current smoker if age is 35 or older        Passed - Recent (12 mo) or future (30 days) visit within the authorizing provider's specialty     Patient had office visit in the last 12 months or has a visit in the next 30 days with authorizing provider or within the authorizing provider's specialty.  See \"Patient Info\" tab in inbasket, or \"Choose Columns\" in Meds & Orders section of the refill encounter.              Passed - Medication is active on med list        Passed - No active pregnancy on record        Passed - No positive pregnancy test in past 12 months          "

## 2019-06-21 NOTE — TELEPHONE ENCOUNTER
Last filled 04-   Last qty 84  Last office visit 03-      Tatiana Molina [facility]   Certified Pharmacy Technician

## 2020-02-17 ENCOUNTER — OFFICE VISIT (OUTPATIENT)
Dept: FAMILY MEDICINE | Facility: CLINIC | Age: 25
End: 2020-02-17
Payer: COMMERCIAL

## 2020-02-17 VITALS
TEMPERATURE: 97.8 F | BODY MASS INDEX: 22.82 KG/M2 | SYSTOLIC BLOOD PRESSURE: 110 MMHG | WEIGHT: 142 LBS | HEIGHT: 66 IN | HEART RATE: 78 BPM | DIASTOLIC BLOOD PRESSURE: 66 MMHG

## 2020-02-17 DIAGNOSIS — Z00.00 ROUTINE GENERAL MEDICAL EXAMINATION AT A HEALTH CARE FACILITY: Primary | ICD-10-CM

## 2020-02-17 DIAGNOSIS — Z30.41 ENCOUNTER FOR SURVEILLANCE OF CONTRACEPTIVE PILLS: ICD-10-CM

## 2020-02-17 PROCEDURE — 99395 PREV VISIT EST AGE 18-39: CPT | Performed by: PHYSICIAN ASSISTANT

## 2020-02-17 RX ORDER — DESOGESTREL AND ETHINYL ESTRADIOL 0.15-0.03
1 KIT ORAL DAILY
Qty: 84 TABLET | Refills: 3 | Status: SHIPPED | OUTPATIENT
Start: 2020-02-17 | End: 2021-01-18

## 2020-02-17 ASSESSMENT — ENCOUNTER SYMPTOMS
ABDOMINAL PAIN: 1
ARTHRALGIAS: 0
MYALGIAS: 0
FREQUENCY: 0
HEMATURIA: 0
SORE THROAT: 0
DIARRHEA: 0
JOINT SWELLING: 0
DYSURIA: 0
NERVOUS/ANXIOUS: 0
COUGH: 0
SHORTNESS OF BREATH: 0
FEVER: 0
HEADACHES: 0
BREAST MASS: 0
DIZZINESS: 0
CONSTIPATION: 0
PARESTHESIAS: 0
CHILLS: 0
PALPITATIONS: 0
HEMATOCHEZIA: 0
WEAKNESS: 0
NAUSEA: 0
EYE PAIN: 0
HEARTBURN: 0

## 2020-02-17 ASSESSMENT — MIFFLIN-ST. JEOR: SCORE: 1392.92

## 2020-02-17 ASSESSMENT — PAIN SCALES - GENERAL: PAINLEVEL: NO PAIN (0)

## 2020-02-17 NOTE — PROGRESS NOTES
SUBJECTIVE:   CC: Vira Gilmore is an 24 year old woman who presents for preventive health visit.     Answers for HPI/ROS submitted by the patient on 2/17/2020   Annual Exam:  Frequency of exercise:: 4-5 days/week  Getting at least 3 servings of Calcium per day:: NO  Diet:: Regular (no restrictions)  Taking medications regularly:: Yes  Medication side effects:: Not applicable  Bi-annual eye exam:: Yes  Dental care twice a year:: Yes  Sleep apnea or symptoms of sleep apnea:: None  abdominal pain: Yes  Blood in stool: No  Blood in urine: No  chest pain: No  chills: No  congestion: No  constipation: No  cough: No  diarrhea: No  dizziness: No  ear pain: No  eye pain: No  nervous/anxious: No  fever: No  frequency: No  genital sores: No  headaches: No  hearing loss: No  heartburn: No  arthralgias: No  joint swelling: No  peripheral edema: No  mood changes: No  myalgias: No  nausea: No  dysuria: No  palpitations: No  Skin sensation changes: No  sore throat: No  urgency: No  rash: No  shortness of breath: No  visual disturbance: No  weakness: No  pelvic pain: No  vaginal bleeding: No  vaginal discharge: No  tenderness: No  breast mass: No  breast discharge: No  Additional concerns today:: No  Duration of exercise:: 30-45 minutes    Would like to switch birth control to see if she an get better control of her acne    Today's PHQ-2 Score:   PHQ-2 ( 1999 Pfizer) 2/17/2020 2/17/2020   Q1: Little interest or pleasure in doing things 0 0   Q2: Feeling down, depressed or hopeless 0 0   PHQ-2 Score 0 0   Q1: Little interest or pleasure in doing things Not at all -   Q2: Feeling down, depressed or hopeless Not at all -   PHQ-2 Score 0 -       Abuse: Current or Past(Physical, Sexual or Emotional)- No  Do you feel safe in your environment? Yes        Social History     Tobacco Use     Smoking status: Never Smoker     Smokeless tobacco: Never Used   Substance Use Topics     Alcohol use: Yes     Comment: Not very common.  "Occasionally     If you drink alcohol do you typically have >3 drinks per day or >7 drinks per week? No                     Reviewed orders with patient.  Reviewed health maintenance and updated orders accordingly - Yes  BP Readings from Last 3 Encounters:   02/17/20 110/66   03/28/19 120/74   11/19/18 120/64    Wt Readings from Last 3 Encounters:   02/17/20 64.4 kg (142 lb)   03/28/19 65 kg (143 lb 3.2 oz)   11/19/18 65.8 kg (145 lb)                    Mammogram not appropriate for this patient based on age.    Pertinent mammograms are reviewed under the imaging tab.  History of abnormal Pap smear: NO - age 21-29 PAP every 3 years recommended  PAP / HPV 11/19/2018   PAP NIL     Reviewed and updated as needed this visit by clinical staff  Tobacco  Allergies  Meds  Med Hx  Surg Hx  Fam Hx  Soc Hx        Reviewed and updated as needed this visit by Provider            ROS:  CONSTITUTIONAL: NEGATIVE for fever, chills, change in weight  INTEGUMENTARU/SKIN: NEGATIVE for worrisome rashes, moles or lesions  EYES: NEGATIVE for vision changes or irritation  ENT: NEGATIVE for ear, mouth and throat problems  RESP: NEGATIVE for significant cough or SOB  BREAST: NEGATIVE for masses, tenderness or discharge  CV: NEGATIVE for chest pain, palpitations or peripheral edema  GI: NEGATIVE for nausea, abdominal pain, heartburn, or change in bowel habits  : NEGATIVE for unusual urinary or vaginal symptoms. Periods are regular.  MUSCULOSKELETAL: NEGATIVE for significant arthralgias or myalgia  NEURO: NEGATIVE for weakness, dizziness or paresthesias  PSYCHIATRIC: NEGATIVE for changes in mood or affect    OBJECTIVE:   /66   Pulse 78   Temp 97.8  F (36.6  C) (Tympanic)   Ht 1.664 m (5' 5.5\")   Wt 64.4 kg (142 lb)   BMI 23.27 kg/m    EXAM:  GENERAL: healthy, alert and no distress  EYES: Eyes grossly normal to inspection, PERRL and conjunctivae and sclerae normal  HENT: ear canals and TM's normal, nose and mouth without " "ulcers or lesions  NECK: no adenopathy, no asymmetry, masses, or scars and thyroid normal to palpation  RESP: lungs clear to auscultation - no rales, rhonchi or wheezes  BREAST: normal without masses, tenderness or nipple discharge and no palpable axillary masses or adenopathy  CV: regular rate and rhythm, normal S1 S2, no S3 or S4, no murmur, click or rub, no peripheral edema and peripheral pulses strong  ABDOMEN: soft, nontender, no hepatosplenomegaly, no masses and bowel sounds normal  MS: no gross musculoskeletal defects noted, no edema  SKIN: no suspicious lesions or rashes  NEURO: Normal strength and tone, mentation intact and speech normal  PSYCH: mentation appears normal, affect normal/bright    Diagnostic Test Results:  none     ASSESSMENT/PLAN:   (Z00.00) Routine general medical examination at a health care facility  (primary encounter diagnosis)  Comment:   Plan: desogestrel-ethinyl estradiol (APRI) 0.15-30         MG-MCG tablet            (Z30.41) Encounter for surveillance of contraceptive pills  Comment:   Plan: desogestrel-ethinyl estradiol (APRI) 0.15-30         MG-MCG tablet              COUNSELING:   Reviewed preventive health counseling, as reflected in patient instructions       Regular exercise       Healthy diet/nutrition    Estimated body mass index is 23.27 kg/m  as calculated from the following:    Height as of this encounter: 1.664 m (5' 5.5\").    Weight as of this encounter: 64.4 kg (142 lb).         reports that she has never smoked. She has never used smokeless tobacco.      Counseling Resources:  ATP IV Guidelines  Pooled Cohorts Equation Calculator  Breast Cancer Risk Calculator  FRAX Risk Assessment  ICSI Preventive Guidelines  Dietary Guidelines for Americans, 2010  USDA's MyPlate  ASA Prophylaxis  Lung CA Screening    Obdulia Collier PA-C  [facility]  "

## 2020-02-24 ENCOUNTER — HEALTH MAINTENANCE LETTER (OUTPATIENT)
Age: 25
End: 2020-02-24

## 2020-12-13 ENCOUNTER — HEALTH MAINTENANCE LETTER (OUTPATIENT)
Age: 25
End: 2020-12-13

## 2021-01-18 ENCOUNTER — OFFICE VISIT (OUTPATIENT)
Dept: FAMILY MEDICINE | Facility: CLINIC | Age: 26
End: 2021-01-18
Payer: COMMERCIAL

## 2021-01-18 VITALS
DIASTOLIC BLOOD PRESSURE: 66 MMHG | HEIGHT: 66 IN | BODY MASS INDEX: 23.46 KG/M2 | SYSTOLIC BLOOD PRESSURE: 116 MMHG | HEART RATE: 74 BPM | TEMPERATURE: 98 F | WEIGHT: 146 LBS

## 2021-01-18 DIAGNOSIS — Z30.41 ENCOUNTER FOR SURVEILLANCE OF CONTRACEPTIVE PILLS: ICD-10-CM

## 2021-01-18 DIAGNOSIS — Z00.00 ROUTINE GENERAL MEDICAL EXAMINATION AT A HEALTH CARE FACILITY: Primary | ICD-10-CM

## 2021-01-18 PROCEDURE — 99395 PREV VISIT EST AGE 18-39: CPT | Mod: 25 | Performed by: PHYSICIAN ASSISTANT

## 2021-01-18 PROCEDURE — 90686 IIV4 VACC NO PRSV 0.5 ML IM: CPT | Performed by: PHYSICIAN ASSISTANT

## 2021-01-18 PROCEDURE — 90471 IMMUNIZATION ADMIN: CPT | Performed by: PHYSICIAN ASSISTANT

## 2021-01-18 RX ORDER — DESOGESTREL AND ETHINYL ESTRADIOL 0.15-0.03
1 KIT ORAL DAILY
Qty: 84 TABLET | Refills: 3 | Status: SHIPPED | OUTPATIENT
Start: 2021-01-18 | End: 2021-08-17

## 2021-01-18 ASSESSMENT — PAIN SCALES - GENERAL: PAINLEVEL: NO PAIN (0)

## 2021-01-18 ASSESSMENT — MIFFLIN-ST. JEOR: SCORE: 1406.06

## 2021-01-18 NOTE — PROGRESS NOTES
SUBJECTIVE:   CC: Vira Gilmore is an 25 year old woman who presents for preventive health visit.       Patient has been advised of split billing requirements and indicates understanding: Yes  Healthy Habits:    Do you get at least three servings of calcium containing foods daily (dairy, green leafy vegetables, etc.)? yes    Amount of exercise or daily activities, outside of work: 4 day(s) per week    Problems taking medications regularly No    Medication side effects: No    Have you had an eye exam in the past two years? yes    Do you see a dentist twice per year? yes    Do you have sleep apnea, excessive snoring or daytime drowsiness?yes          Today's PHQ-2 Score:   PHQ-2 ( 1999 Pfizer) 1/18/2021 2/17/2020   Q1: Little interest or pleasure in doing things 0 0   Q2: Feeling down, depressed or hopeless 0 0   PHQ-2 Score 0 0   Q1: Little interest or pleasure in doing things - Not at all   Q2: Feeling down, depressed or hopeless - Not at all   PHQ-2 Score - 0       Abuse: Current or Past(Physical, Sexual or Emotional)- No  Do you feel safe in your environment? Yes        Social History     Tobacco Use     Smoking status: Never Smoker     Smokeless tobacco: Never Used   Substance Use Topics     Alcohol use: Yes     Comment: Not very common. Occasionally     If you drink alcohol do you typically have >3 drinks per day or >7 drinks per week? No                     Reviewed orders with patient.  Reviewed health maintenance and updated orders accordingly - Yes  BP Readings from Last 3 Encounters:   01/18/21 116/66   02/17/20 110/66   03/28/19 120/74    Wt Readings from Last 3 Encounters:   01/18/21 66.2 kg (146 lb)   02/17/20 64.4 kg (142 lb)   03/28/19 65 kg (143 lb 3.2 oz)                    Mammogram not appropriate for this patient based on age.    Pertinent mammograms are reviewed under the imaging tab.  History of abnormal Pap smear: NO - age 21-29 PAP every 3 years recommended  PAP / HPV 11/19/2018   PAP  "NIL     Reviewed and updated as needed this visit by clinical staff  Tobacco  Allergies  Meds   Med Hx  Surg Hx  Fam Hx  Soc Hx        Reviewed and updated as needed this visit by Provider                    ROS:  CONSTITUTIONAL: NEGATIVE for fever, chills, change in weight  INTEGUMENTARU/SKIN: NEGATIVE for worrisome rashes, moles or lesions  EYES: NEGATIVE for vision changes or irritation  ENT: NEGATIVE for ear, mouth and throat problems  RESP: NEGATIVE for significant cough or SOB  BREAST: NEGATIVE for masses, tenderness or discharge  CV: NEGATIVE for chest pain, palpitations or peripheral edema  GI: NEGATIVE for nausea, abdominal pain, heartburn, or change in bowel habits  : NEGATIVE for unusual urinary or vaginal symptoms. Periods are regular.  MUSCULOSKELETAL: NEGATIVE for significant arthralgias or myalgia  NEURO: NEGATIVE for weakness, dizziness or paresthesias  PSYCHIATRIC: NEGATIVE for changes in mood or affect    OBJECTIVE:   /66   Pulse 74   Temp 98  F (36.7  C) (Tympanic)   Ht 1.664 m (5' 5.5\")   Wt 66.2 kg (146 lb)   BMI 23.93 kg/m    EXAM:  GENERAL: healthy, alert and no distress  EYES: Eyes grossly normal to inspection, PERRL and conjunctivae and sclerae normal  HENT: ear canals and TM's normal, nose and mouth without ulcers or lesions  NECK: no adenopathy, no asymmetry, masses, or scars and thyroid normal to palpation  RESP: lungs clear to auscultation - no rales, rhonchi or wheezes  BREAST: normal without masses, tenderness or nipple discharge and no palpable axillary masses or adenopathy  CV: regular rate and rhythm, normal S1 S2, no S3 or S4, no murmur, click or rub, no peripheral edema and peripheral pulses strong  ABDOMEN: soft, nontender, no hepatosplenomegaly, no masses and bowel sounds normal  MS: no gross musculoskeletal defects noted, no edema  SKIN: no suspicious lesions or rashes  NEURO: Normal strength and tone, mentation intact and speech normal  PSYCH: mentation " "appears normal, affect normal/bright    Diagnostic Test Results:  none     ASSESSMENT/PLAN:   (Z00.00) Routine general medical examination at a health care facility  (primary encounter diagnosis)  Comment:   Plan: desogestrel-ethinyl estradiol (APRI) 0.15-30         MG-MCG tablet            (Z30.41) Encounter for surveillance of contraceptive pills  Comment:   Plan: desogestrel-ethinyl estradiol (APRI) 0.15-30         MG-MCG tablet              Patient has been advised of split billing requirements and indicates understanding: Yes  COUNSELING:   Reviewed preventive health counseling, as reflected in patient instructions    Estimated body mass index is 23.93 kg/m  as calculated from the following:    Height as of this encounter: 1.664 m (5' 5.5\").    Weight as of this encounter: 66.2 kg (146 lb).        She reports that she has never smoked. She has never used smokeless tobacco.      Counseling Resources:  ATP IV Guidelines  Pooled Cohorts Equation Calculator  Breast Cancer Risk Calculator  BRCA-Related Cancer Risk Assessment: FHS-7 Tool  FRAX Risk Assessment  ICSI Preventive Guidelines  Dietary Guidelines for Americans, 2010  USDA's MyPlate  ASA Prophylaxis  Lung CA Screening    Obdulia Collier PA-C  [facility]  "

## 2021-06-23 ENCOUNTER — E-VISIT (OUTPATIENT)
Dept: URGENT CARE | Facility: URGENT CARE | Age: 26
End: 2021-06-23
Payer: COMMERCIAL

## 2021-06-23 DIAGNOSIS — N39.0 ACUTE UTI (URINARY TRACT INFECTION): Primary | ICD-10-CM

## 2021-06-23 PROCEDURE — 99421 OL DIG E/M SVC 5-10 MIN: CPT | Performed by: PHYSICIAN ASSISTANT

## 2021-06-23 RX ORDER — NITROFURANTOIN 25; 75 MG/1; MG/1
100 CAPSULE ORAL 2 TIMES DAILY
Qty: 10 CAPSULE | Refills: 0 | Status: SHIPPED | OUTPATIENT
Start: 2021-06-23 | End: 2021-06-28

## 2021-06-23 NOTE — PATIENT INSTRUCTIONS
Dear Vira Gilmore    After reviewing your responses, I've been able to diagnose you with a urinary tract infection, which is a common infection of the bladder with bacteria.  This is not a sexually transmitted infection, though urinating immediately after intercourse can help prevent infections.  Drinking lots of fluids is also helpful to clear your current infection and prevent the next one.      I have sent a prescription for antibiotics to your pharmacy to treat this infection.    It is important that you take all of your prescribed medication even if your symptoms are improving after a few doses.  Taking all of your medicine helps prevent the symptoms from returning.     If your symptoms worsen, you develop pain in your back or stomach, develop fevers, or are not improving in 5 days, please contact your primary care provider for an appointment or visit any of our convenient Walk-in or Urgent Care Centers to be seen, which can be found on our website here.    Thanks again for choosing us as your health care partner,    Robin Hoyos PA-C

## 2021-06-24 ENCOUNTER — MYC MEDICAL ADVICE (OUTPATIENT)
Dept: FAMILY MEDICINE | Facility: CLINIC | Age: 26
End: 2021-06-24

## 2021-06-25 NOTE — TELEPHONE ENCOUNTER
Patient had an evisit on 6/23/21 for UTI and was prescribed Macrobid 100 mg BID x 5 days. Patient called to report that she is still experiencing back pain. It is getting better, but it is still there. She is wanting to know if she should continue on same medication or if she needs to be switched. Routed to provider and pool.  Sharon Mike RN

## 2021-06-28 NOTE — TELEPHONE ENCOUNTER
Patient returned call, states she is feeling much better and doesn't need an appointment any longer.      Yoko Moreira, RACHEAL Rios

## 2021-06-28 NOTE — TELEPHONE ENCOUNTER
Call placed to patient   No answer; generic voicemail left requesting call back to Clinic RN at [phone]    Jose Arzate RN

## 2021-06-28 NOTE — TELEPHONE ENCOUNTER
Because it was an e visit and no specimen (urine) collected it is difficult to know if the back pain is still from the presumed UTI that is not fully treated or if it is another issue. I would suggest she schedule an office appointment or if that's not possible, a virtual visit (telephone or video) would be fine but we really need to collect a urine sample    Obdulia Collier PA-C  \

## 2021-09-26 ENCOUNTER — HEALTH MAINTENANCE LETTER (OUTPATIENT)
Age: 26
End: 2021-09-26

## 2022-03-13 ENCOUNTER — HEALTH MAINTENANCE LETTER (OUTPATIENT)
Age: 27
End: 2022-03-13

## 2022-04-13 ENCOUNTER — OFFICE VISIT (OUTPATIENT)
Dept: FAMILY MEDICINE | Facility: CLINIC | Age: 27
End: 2022-04-13
Payer: COMMERCIAL

## 2022-04-13 VITALS
HEART RATE: 75 BPM | RESPIRATION RATE: 16 BRPM | DIASTOLIC BLOOD PRESSURE: 62 MMHG | OXYGEN SATURATION: 100 % | SYSTOLIC BLOOD PRESSURE: 100 MMHG | TEMPERATURE: 98.7 F | HEIGHT: 66 IN | BODY MASS INDEX: 23.38 KG/M2 | WEIGHT: 145.5 LBS

## 2022-04-13 DIAGNOSIS — N91.2 AMENORRHEA: Primary | ICD-10-CM

## 2022-04-13 LAB — HCG UR QL: POSITIVE

## 2022-04-13 PROCEDURE — 81025 URINE PREGNANCY TEST: CPT | Performed by: FAMILY MEDICINE

## 2022-04-13 PROCEDURE — 99214 OFFICE O/P EST MOD 30 MIN: CPT | Performed by: FAMILY MEDICINE

## 2022-04-13 NOTE — PROGRESS NOTES
Answers for HPI/ROS submitted by the patient on 4/12/2022  How many servings of fruits and vegetables do you eat daily?: 2-3  On average, how many sweetened beverages do you drink each day (Examples: soda, juice, sweet tea, etc.  Do NOT count diet or artificially sweetened beverages)?: 0  How many minutes a day do you exercise enough to make your heart beat faster?: 30 to 60  How many days a week do you exercise enough to make your heart beat faster?: 4  How many days per week do you miss taking your medication?: 0  What is the reason for your visit today?: Prenatal Visit    Assessment/Plan:     Vira Gilmore is a 26 year old female presenting for:     Pregnancy confirmation: pregnancy test is positive today. Congratulations was given.     She is approximately 9 weeks 5 days gestation with an ANAIS of 11/11/22.   We went over the complete prenatal packet in its entirety.   We discussed lifestyle modifications, safe medications in pregnancy, and foods to avoid.  Discussed the importance of a prenatal vitamin.      - We discussed trisomy testing today and she will discuss with her  Joss.      - Early US was ordered   - She will follow up with me at 10 to 12 weeks for her first OB appointment.    There are no discontinued medications.     I personally spent over 35 minutes performing care related to this patient on the day of the patient visit.  This includes chart review, precharting, talking with/ examining the patient as well as completing after visit documentation.      Chief Complaint:  chief complaint      Subjective:     Vira Gilmore is a 26 year old female who is a to our clinic presenting for a pregnancy confirmation appointment.  Her LMP started on 2/5/22 placing her due date on 11/11/22.    She is a middle school counselor    This is her fist pregnancy - her and her  are very excited.    She was feeling fairly ill earlier on but is doing a bit better now.  She is doing vitamin B6 and  "Unisom.  She is taking prenatal vitamin.    Review of Systems - Negative except as above    Medications: reviewed -   No current outpatient medications on file.     No current facility-administered medications for this visit.       Past medical history: reviewed - No past medical history on file.    Past surgical history: reviewed -   Past Surgical History:   Procedure Laterality Date     NO HISTORY OF SURGERY         Family history: reviewed -   Family History   Problem Relation Age of Onset     Blood Disease Father         leukemia     Cerebrovascular Disease Father         TIA     Diabetes Father      Other Cancer Father         Lukemia     Hypertension Mother      Thyroid Disease Mother      Obesity Mother      Diabetes Paternal Grandmother      Hypertension Paternal Grandmother      Cerebrovascular Disease Maternal Grandmother      Thyroid Disease Maternal Grandfather      Asthma No family hx of      C.A.D. No family hx of      Breast Cancer No family hx of      Cancer - colorectal No family hx of      Prostate Cancer No family hx of        Social history: reviewed -   Social History     Socioeconomic History     Marital status:      Spouse name: None     Number of children: 0     Years of education: 12+     Highest education level: None   Occupational History     Employer: STUDENT   Tobacco Use     Smoking status: Never Smoker     Smokeless tobacco: Never Used   Substance and Sexual Activity     Alcohol use: Yes     Comment: Not very common. Occasionally     Drug use: No     Sexual activity: Yes     Partners: Male     Birth control/protection: Condom, Pill   Other Topics Concern     Parent/sibling w/ CABG, MI or angioplasty before 65F 55M? No       Objective:  /62   Pulse 75   Temp 98.7  F (37.1  C) (Tympanic)   Resp 16   Ht 1.664 m (5' 5.5\")   Wt 66 kg (145 lb 8 oz)   LMP 02/07/2022 (Exact Date)   SpO2 100%   BMI 23.84 kg/m      Vital signs reviewed and stable  General: No acute " distress  Psych: Appropriate affect  HEENT: moist mucous membranes, pupils equal, round, reactive to light and accomodation, posterior oropharynx clear of erythema or exudate, tympanic membranes are pearly grey bilaterally  Lymph: no cervical or supraclavicular lymphadenopathy  Cardiovascular: regular rate and rhythm with no murmur  Pulmonary: clear to auscultation bilaterally with no wheeze  Abdomen: soft, non tender, non distended with normo-active bowel sounds  Extremities: warm and well perfused with no edema  Skin: warm and dry with no rash    Bedside ultrasound: Bedside ultrasound performed showing single intrauterine gestation with heartbeat noted.

## 2022-04-13 NOTE — PATIENT INSTRUCTIONS
Pregnancy Information    We will see you every 4 weeks until 32 weeks, every 2 weeks until 34 weeks and every week until delivery    [facility] for delivery - #[phone]     US for dates at around 8-10 weeks     Testing for trisomies (downs syndrome, trisomy 13 and trisomy 18) -    - US testing and bloodwork at [facility] ([place] at around 13 weeks (no later than 13w6d)  OR   - Progenity genetic testing done any time after 10 weeks    US for gender and fetal survey at about 20 weeks    Blood sugar test between 24 and 28 weeks    Continue to take a prenatal vitamin - I recommend one with 800 mcg or folic acid, 27mg of elemental iron and 150 mcg of iodine     I also recommend shooting for 1,000-1,300 mg/day of Calcium (either through vitamin or diet)    Nausea in pregnancy:  I would recommend vitamin B6 for nausea.  Dosing as follows:  -Pyridoxine (B6) - 25 mg orally every six to eight hours; the maximum treatment dose suggested for pregnant women is 200 mg/day  -You can also add on Unisom (Doxyamine) - 20mg at night and up to two 10 mg doses throughout the day (for a total of up to 40mg/day) although it will likely make you drowsy

## 2022-04-25 ENCOUNTER — HOSPITAL ENCOUNTER (OUTPATIENT)
Dept: ULTRASOUND IMAGING | Facility: CLINIC | Age: 27
Discharge: HOME | End: 2022-04-25
Attending: FAMILY MEDICINE | Admitting: FAMILY MEDICINE
Payer: COMMERCIAL

## 2022-04-25 DIAGNOSIS — N91.2 AMENORRHEA: ICD-10-CM

## 2022-04-25 PROCEDURE — 76801 OB US < 14 WKS SINGLE FETUS: CPT

## 2022-05-03 ENCOUNTER — PRENATAL OFFICE VISIT (OUTPATIENT)
Dept: FAMILY MEDICINE | Facility: CLINIC | Age: 27
End: 2022-05-03
Payer: COMMERCIAL

## 2022-05-03 VITALS
OXYGEN SATURATION: 99 % | RESPIRATION RATE: 16 BRPM | DIASTOLIC BLOOD PRESSURE: 60 MMHG | BODY MASS INDEX: 24.03 KG/M2 | TEMPERATURE: 98.4 F | HEART RATE: 76 BPM | SYSTOLIC BLOOD PRESSURE: 112 MMHG | WEIGHT: 149.5 LBS | HEIGHT: 66 IN

## 2022-05-03 DIAGNOSIS — Z12.4 CERVICAL CANCER SCREENING: ICD-10-CM

## 2022-05-03 DIAGNOSIS — Z11.59 NEED FOR HEPATITIS C SCREENING TEST: ICD-10-CM

## 2022-05-03 DIAGNOSIS — Z34.01 ENCOUNTER FOR SUPERVISION OF NORMAL FIRST PREGNANCY IN FIRST TRIMESTER: Primary | ICD-10-CM

## 2022-05-03 DIAGNOSIS — Z00.00 HEALTHCARE MAINTENANCE: ICD-10-CM

## 2022-05-03 LAB
ABO/RH(D): NORMAL
ANTIBODY SCREEN: NEGATIVE
ERYTHROCYTE [DISTWIDTH] IN BLOOD BY AUTOMATED COUNT: 12.7 % (ref 10–15)
HCT VFR BLD AUTO: 37.9 % (ref 35–47)
HGB BLD-MCNC: 12.5 G/DL (ref 11.7–15.7)
MCH RBC QN AUTO: 29 PG (ref 26.5–33)
MCHC RBC AUTO-ENTMCNC: 33 G/DL (ref 31.5–36.5)
MCV RBC AUTO: 88 FL (ref 78–100)
PLATELET # BLD AUTO: 247 10E3/UL (ref 150–450)
RBC # BLD AUTO: 4.31 10E6/UL (ref 3.8–5.2)
SPECIMEN EXPIRATION DATE: NORMAL
WBC # BLD AUTO: 13.9 10E3/UL (ref 4–11)

## 2022-05-03 PROCEDURE — 86901 BLOOD TYPING SEROLOGIC RH(D): CPT | Performed by: FAMILY MEDICINE

## 2022-05-03 PROCEDURE — 99207 PR FIRST OB VISIT: CPT | Performed by: FAMILY MEDICINE

## 2022-05-03 PROCEDURE — 87389 HIV-1 AG W/HIV-1&-2 AB AG IA: CPT | Performed by: FAMILY MEDICINE

## 2022-05-03 PROCEDURE — 86780 TREPONEMA PALLIDUM: CPT | Performed by: FAMILY MEDICINE

## 2022-05-03 PROCEDURE — 87340 HEPATITIS B SURFACE AG IA: CPT | Performed by: FAMILY MEDICINE

## 2022-05-03 PROCEDURE — 85027 COMPLETE CBC AUTOMATED: CPT | Performed by: FAMILY MEDICINE

## 2022-05-03 PROCEDURE — 86762 RUBELLA ANTIBODY: CPT | Performed by: FAMILY MEDICINE

## 2022-05-03 PROCEDURE — 86850 RBC ANTIBODY SCREEN: CPT | Performed by: FAMILY MEDICINE

## 2022-05-03 PROCEDURE — 99213 OFFICE O/P EST LOW 20 MIN: CPT | Performed by: FAMILY MEDICINE

## 2022-05-03 PROCEDURE — 86803 HEPATITIS C AB TEST: CPT | Performed by: FAMILY MEDICINE

## 2022-05-03 PROCEDURE — G0145 SCR C/V CYTO,THINLAYER,RESCR: HCPCS | Performed by: FAMILY MEDICINE

## 2022-05-03 PROCEDURE — 36415 COLL VENOUS BLD VENIPUNCTURE: CPT | Performed by: FAMILY MEDICINE

## 2022-05-03 PROCEDURE — 87086 URINE CULTURE/COLONY COUNT: CPT | Performed by: FAMILY MEDICINE

## 2022-05-03 PROCEDURE — 86900 BLOOD TYPING SEROLOGIC ABO: CPT | Performed by: FAMILY MEDICINE

## 2022-05-03 NOTE — PROGRESS NOTES
"Assessment/Plan:    Vira Gilmore is a 26 year old female presenting for:    Encounter for supervision of normal first pregnancy in first trimester  - ABO/Rh type and screen  - CBC with platelets  - HIV Antigen Antibody Combo  - Rubella Antibody IgG  - Hepatitis B surface antigen  - Treponema Abs w Reflex to RPR and Titer  - Urine Culture  - Urine Culture  - ABO/Rh type and screen  - CBC with platelets  - HIV Antigen Antibody Combo  - Rubella Antibody IgG  - Hepatitis B surface antigen  - Treponema Abs w Reflex to RPR and Titer    Cervical cancer screening  - Pap Screen reflex to HPV if ASCUS - recommend age 25 - 29    Need for hepatitis C screening test  - Hepatitis C Screen Reflex to HCV RNA Quant and Genotype  - Hepatitis C Screen Reflex to HCV RNA Quant and Genotype      There are no discontinued medications.        Chief Complaint:  Prenatal Care        Subjective:   Vira Gilmore is a very pleasant 26-year-old female presenting to the clinic today for her first OB visit.    She is overall doing well.  She has some fairly severe nausea for a few weeks but that is feeling improved.  No specific questions or concerns today.  She had an early ultrasound which confirmed her dates.    12 point review of systems completed and negative except for what has been described above    History   Smoking Status     Never Smoker   Smokeless Tobacco     Never Used       No current outpatient medications on file.      Objective:  Vitals:    05/03/22 1557   BP: 112/60   Pulse: 76   Resp: 16   Temp: 98.4  F (36.9  C)   TempSrc: Tympanic   SpO2: 99%   Weight: 67.8 kg (149 lb 8 oz)   Height: 1.664 m (5' 5.5\")       Body mass index is 24.5 kg/m .    Vital signs reviewed and stable  General: No acute distress  Psych: Appropriate affect  HEENT: moist mucous membranes, pupils equal, round, reactive to light and accomodation, tympanic membranes are pearly grey bilaterally  Lymph: no cervical or supraclavicular " lymphadenopathy  Cardiovascular: regular rate and rhythm with no murmur  Pulmonary: clear to auscultation bilaterally with no wheeze  Abdomen: soft, non tender, non distended with normo-active bowel sounds  Extremities: warm and well perfused with no edema  Skin: warm and dry with no rash    Fetal heart tones: [place] on Doppler as 155 beats per minute     This note has been dictated and transcribed using voice recognition software.   Any errors in transcription are unintentional and inherent to the software.

## 2022-05-04 LAB
HBV SURFACE AG SERPL QL IA: NONREACTIVE
HCV AB SERPL QL IA: NONREACTIVE
HIV 1+2 AB+HIV1 P24 AG SERPL QL IA: NONREACTIVE
RUBV IGG SERPL QL IA: 3.1 INDEX
RUBV IGG SERPL QL IA: POSITIVE
T PALLIDUM AB SER QL: NONREACTIVE

## 2022-05-05 LAB — BACTERIA UR CULT: NO GROWTH

## 2022-05-06 LAB
BKR LAB AP GYN ADEQUACY: NORMAL
BKR LAB AP GYN INTERPRETATION: NORMAL
BKR LAB AP HPV REFLEX: NORMAL
BKR LAB AP PREVIOUS ABNORMAL: NORMAL
PATH REPORT.COMMENTS IMP SPEC: NORMAL
PATH REPORT.COMMENTS IMP SPEC: NORMAL
PATH REPORT.RELEVANT HX SPEC: NORMAL

## 2022-05-31 ENCOUNTER — PRENATAL OFFICE VISIT (OUTPATIENT)
Dept: FAMILY MEDICINE | Facility: CLINIC | Age: 27
End: 2022-05-31
Payer: COMMERCIAL

## 2022-05-31 VITALS
RESPIRATION RATE: 17 BRPM | TEMPERATURE: 98.2 F | SYSTOLIC BLOOD PRESSURE: 114 MMHG | HEIGHT: 66 IN | WEIGHT: 153.85 LBS | HEART RATE: 70 BPM | BODY MASS INDEX: 24.73 KG/M2 | DIASTOLIC BLOOD PRESSURE: 60 MMHG | OXYGEN SATURATION: 100 %

## 2022-05-31 DIAGNOSIS — Z34.02 ENCOUNTER FOR SUPERVISION OF NORMAL FIRST PREGNANCY IN SECOND TRIMESTER: ICD-10-CM

## 2022-05-31 DIAGNOSIS — Z13.220 SCREENING FOR HYPERLIPIDEMIA: Primary | ICD-10-CM

## 2022-05-31 PROCEDURE — 99207 PR PRENATAL VISIT: CPT | Performed by: FAMILY MEDICINE

## 2022-06-02 NOTE — PROGRESS NOTES
Vira is a very pleasant 26-year-old G1, P0 presenting to the clinic today for prenatal care in a normal first pregnancy in the second trimester.    She is overall feeling well.  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    She is no specific questions or concerns.  Ultrasound referral given for 20-week ultrasound.  Patient has a negative blood type and will need RhoGAM at 28 weeks.

## 2022-06-27 ENCOUNTER — HOSPITAL ENCOUNTER (OUTPATIENT)
Dept: ULTRASOUND IMAGING | Facility: CLINIC | Age: 27
Discharge: HOME | End: 2022-06-27
Attending: FAMILY MEDICINE | Admitting: FAMILY MEDICINE
Payer: COMMERCIAL

## 2022-06-27 DIAGNOSIS — Z34.02 ENCOUNTER FOR SUPERVISION OF NORMAL FIRST PREGNANCY IN SECOND TRIMESTER: ICD-10-CM

## 2022-06-27 PROCEDURE — 76805 OB US >/= 14 WKS SNGL FETUS: CPT

## 2022-07-01 ENCOUNTER — PRENATAL OFFICE VISIT (OUTPATIENT)
Dept: FAMILY MEDICINE | Facility: CLINIC | Age: 27
End: 2022-07-01
Payer: COMMERCIAL

## 2022-07-01 VITALS
OXYGEN SATURATION: 99 % | DIASTOLIC BLOOD PRESSURE: 68 MMHG | BODY MASS INDEX: 26.33 KG/M2 | WEIGHT: 158 LBS | TEMPERATURE: 98.6 F | HEART RATE: 66 BPM | HEIGHT: 65 IN | SYSTOLIC BLOOD PRESSURE: 110 MMHG

## 2022-07-01 DIAGNOSIS — Z34.02 ENCOUNTER FOR SUPERVISION OF NORMAL FIRST PREGNANCY IN SECOND TRIMESTER: Primary | ICD-10-CM

## 2022-07-01 PROCEDURE — 99207 PR PRENATAL VISIT: CPT | Performed by: FAMILY MEDICINE

## 2022-07-01 ASSESSMENT — PAIN SCALES - GENERAL: PAINLEVEL: NO PAIN (0)

## 2022-07-02 NOTE — PROGRESS NOTES
Vira CHAKRABORTY is a very pleasant 26-year-old G1, P0 presented to the clinic today for prenatal care and a normal first pregnancy in the second trimester at 20 weeks and 6 days gestation.    She recently had her 20-week ultrasound which was normal.    No specific questions or concerns.  Discussed that at her next visit we will do her 1 hour blood sugar testing.  At 28 weeks she will need RhoGAM.

## 2022-08-02 ENCOUNTER — PRENATAL OFFICE VISIT (OUTPATIENT)
Dept: FAMILY MEDICINE | Facility: CLINIC | Age: 27
End: 2022-08-02
Payer: COMMERCIAL

## 2022-08-02 VITALS
DIASTOLIC BLOOD PRESSURE: 70 MMHG | BODY MASS INDEX: 28.14 KG/M2 | WEIGHT: 169.13 LBS | SYSTOLIC BLOOD PRESSURE: 118 MMHG

## 2022-08-02 DIAGNOSIS — Z34.01 ENCOUNTER FOR SUPERVISION OF NORMAL FIRST PREGNANCY IN FIRST TRIMESTER: ICD-10-CM

## 2022-08-02 DIAGNOSIS — Z34.02 ENCOUNTER FOR SUPERVISION OF NORMAL FIRST PREGNANCY IN SECOND TRIMESTER: ICD-10-CM

## 2022-08-02 DIAGNOSIS — Z3A.25 25 WEEKS GESTATION OF PREGNANCY: Primary | ICD-10-CM

## 2022-08-02 LAB
ANTIBODY SCREEN: NEGATIVE
GLUCOSE 1H P 50 G GLC PO SERPL-MCNC: 137 MG/DL (ref 70–129)
HGB BLD-MCNC: 11.5 G/DL (ref 11.7–15.7)
SPECIMEN EXPIRATION DATE: NORMAL

## 2022-08-02 PROCEDURE — 86780 TREPONEMA PALLIDUM: CPT | Performed by: FAMILY MEDICINE

## 2022-08-02 PROCEDURE — 82950 GLUCOSE TEST: CPT | Performed by: FAMILY MEDICINE

## 2022-08-02 PROCEDURE — 99207 PR PRENATAL VISIT: CPT | Performed by: FAMILY MEDICINE

## 2022-08-02 PROCEDURE — 36415 COLL VENOUS BLD VENIPUNCTURE: CPT | Performed by: FAMILY MEDICINE

## 2022-08-02 PROCEDURE — 86850 RBC ANTIBODY SCREEN: CPT | Performed by: FAMILY MEDICINE

## 2022-08-02 NOTE — PROGRESS NOTES
Vira is a very pleasant 26-year-old G1, P0 presenting to clinic today for prenatal care and a normal first pregnancy in the second trimester.  She is currently 25 weeks and 3 days gestation.    She unfortunately failed her 1 hour blood sugar testing today but will return for 3-hour test on Thursday.    She will need RhoGAM at 28 weeks and we will schedule an appointment for her at around that time to be reevaluated in the clinic and she can get the RhoGAM at that appointment.    No other specific questions or concerns.

## 2022-08-03 LAB — T PALLIDUM AB SER QL: NONREACTIVE

## 2022-08-04 ENCOUNTER — LAB (OUTPATIENT)
Dept: LAB | Facility: CLINIC | Age: 27
End: 2022-08-04
Payer: COMMERCIAL

## 2022-08-04 ENCOUNTER — TELEPHONE (OUTPATIENT)
Dept: LAB | Facility: CLINIC | Age: 27
End: 2022-08-04

## 2022-08-04 DIAGNOSIS — Z34.02 ENCOUNTER FOR SUPERVISION OF NORMAL FIRST PREGNANCY IN SECOND TRIMESTER: ICD-10-CM

## 2022-08-04 NOTE — PROGRESS NOTES
Pt was here for her 3 GTT and vomited at the  2nd hour, so we had stop testing and cancel it. Please advise the pt what to do next.

## 2022-08-11 ENCOUNTER — MYC MEDICAL ADVICE (OUTPATIENT)
Dept: FAMILY MEDICINE | Facility: CLINIC | Age: 27
End: 2022-08-11

## 2022-08-19 ENCOUNTER — PRENATAL OFFICE VISIT (OUTPATIENT)
Dept: FAMILY MEDICINE | Facility: CLINIC | Age: 27
End: 2022-08-19
Payer: COMMERCIAL

## 2022-08-19 VITALS
BODY MASS INDEX: 28.81 KG/M2 | DIASTOLIC BLOOD PRESSURE: 60 MMHG | SYSTOLIC BLOOD PRESSURE: 100 MMHG | WEIGHT: 173.13 LBS

## 2022-08-19 DIAGNOSIS — O26.892 RH NEGATIVE STATUS DURING PREGNANCY IN SECOND TRIMESTER: ICD-10-CM

## 2022-08-19 DIAGNOSIS — Z67.91 RH NEGATIVE STATUS DURING PREGNANCY IN SECOND TRIMESTER: ICD-10-CM

## 2022-08-19 DIAGNOSIS — Z34.02 ENCOUNTER FOR SUPERVISION OF NORMAL FIRST PREGNANCY IN SECOND TRIMESTER: Primary | ICD-10-CM

## 2022-08-19 LAB — GLUCOSE 1H P 50 G GLC PO SERPL-MCNC: 111 MG/DL (ref 70–129)

## 2022-08-19 PROCEDURE — 96372 THER/PROPH/DIAG INJ SC/IM: CPT | Performed by: FAMILY MEDICINE

## 2022-08-19 PROCEDURE — 82950 GLUCOSE TEST: CPT | Performed by: FAMILY MEDICINE

## 2022-08-19 PROCEDURE — 59425 ANTEPARTUM CARE ONLY: CPT | Performed by: FAMILY MEDICINE

## 2022-08-19 PROCEDURE — 36415 COLL VENOUS BLD VENIPUNCTURE: CPT | Performed by: FAMILY MEDICINE

## 2022-08-19 NOTE — PROGRESS NOTES
Vira is a very pleasant 26-year-old G1, P0 presenting to the clinic today for prenatal care and a normal first pregnancy in the second trimester.  She is currently 27 weeks and 6 days gestation.    She failed her first 1 hour blood sugar testing (by only a few points) but vomited during her 3-hour.  She rechecked her 1 hour today and passed.    RhoGAM given today.  She had antibodies tested last time which were negative.    She is otherwise feeling well.

## 2022-09-14 ENCOUNTER — PRENATAL OFFICE VISIT (OUTPATIENT)
Dept: FAMILY MEDICINE | Facility: CLINIC | Age: 27
End: 2022-09-14
Payer: COMMERCIAL

## 2022-09-14 VITALS — BODY MASS INDEX: 29.95 KG/M2 | WEIGHT: 180 LBS | DIASTOLIC BLOOD PRESSURE: 68 MMHG | SYSTOLIC BLOOD PRESSURE: 110 MMHG

## 2022-09-14 DIAGNOSIS — Z34.03 ENCOUNTER FOR SUPERVISION OF NORMAL FIRST PREGNANCY IN THIRD TRIMESTER: Primary | ICD-10-CM

## 2022-09-14 PROCEDURE — 90472 IMMUNIZATION ADMIN EACH ADD: CPT | Performed by: FAMILY MEDICINE

## 2022-09-14 PROCEDURE — 99207 PR PRENATAL VISIT: CPT | Performed by: FAMILY MEDICINE

## 2022-09-14 PROCEDURE — 90471 IMMUNIZATION ADMIN: CPT | Performed by: FAMILY MEDICINE

## 2022-09-14 PROCEDURE — 90715 TDAP VACCINE 7 YRS/> IM: CPT | Performed by: FAMILY MEDICINE

## 2022-09-14 PROCEDURE — 90686 IIV4 VACC NO PRSV 0.5 ML IM: CPT | Performed by: FAMILY MEDICINE

## 2022-09-14 NOTE — PROGRESS NOTES
Lora is a very pleasant 26-year-old G1, P0 presenting to clinic today for prenatal care and a normal first pregnancy in the third trimester.  Currently 31 weeks and 4 days gestation.    She nearly failed her first 1 hour blood sugar test.  She unfortunately vomited during her 3-hour test.  Rechecked her 1 hour test at her last visit in past with a 111.    She is overall feeling well.  Getting her influenza vaccine and Tdap today.    Believes she will get epidural in labor.  Both her mother as well as her sister had preeclampsia.  She states her sister delivered at term and her first pregnancy but was induced at about 37 weeks and her second.

## 2022-10-10 ENCOUNTER — PRENATAL OFFICE VISIT (OUTPATIENT)
Dept: FAMILY MEDICINE | Facility: CLINIC | Age: 27
End: 2022-10-10
Payer: COMMERCIAL

## 2022-10-10 VITALS
WEIGHT: 189.38 LBS | BODY MASS INDEX: 31.51 KG/M2 | DIASTOLIC BLOOD PRESSURE: 70 MMHG | SYSTOLIC BLOOD PRESSURE: 118 MMHG

## 2022-10-10 DIAGNOSIS — Z34.03 ENCOUNTER FOR SUPERVISION OF NORMAL FIRST PREGNANCY IN THIRD TRIMESTER: Primary | ICD-10-CM

## 2022-10-10 PROCEDURE — 99207 PR PRENATAL VISIT: CPT | Performed by: FAMILY MEDICINE

## 2022-10-10 NOTE — PROGRESS NOTES
Vira is a very pleasant 26-year-old G1, P0 presenting to the clinic today for prenatal care and a normal first pregnancy in the third trimester.  Currently 35 weeks and 2 days gestation.    She is overall feeling well.  She reminds me that both her mother as well as her sister had preeclampsia around 37/38 weeks.    She has some questions about who to call when in labor and we discussed that for that today.  No other specific questions or concerns.  We will follow-up in 1 week.  We will plan group B strep testing and urine test at next visit.    Fetus appears vertex via bedside ultrasound.

## 2022-10-17 ENCOUNTER — PRENATAL OFFICE VISIT (OUTPATIENT)
Dept: FAMILY MEDICINE | Facility: CLINIC | Age: 27
End: 2022-10-17
Payer: COMMERCIAL

## 2022-10-17 VITALS
BODY MASS INDEX: 31.63 KG/M2 | DIASTOLIC BLOOD PRESSURE: 80 MMHG | SYSTOLIC BLOOD PRESSURE: 118 MMHG | WEIGHT: 190.06 LBS

## 2022-10-17 DIAGNOSIS — Z34.03 ENCOUNTER FOR SUPERVISION OF NORMAL FIRST PREGNANCY IN THIRD TRIMESTER: Primary | ICD-10-CM

## 2022-10-17 LAB
ALBUMIN UR-MCNC: NEGATIVE MG/DL
APPEARANCE UR: CLEAR
BILIRUB UR QL STRIP: NEGATIVE
COLOR UR AUTO: YELLOW
GLUCOSE UR STRIP-MCNC: NEGATIVE MG/DL
HGB UR QL STRIP: NEGATIVE
KETONES UR STRIP-MCNC: NEGATIVE MG/DL
LEUKOCYTE ESTERASE UR QL STRIP: NEGATIVE
NITRATE UR QL: NEGATIVE
PH UR STRIP: 6 [PH] (ref 5–7)
SP GR UR STRIP: 1.02 (ref 1–1.03)
UROBILINOGEN UR STRIP-ACNC: 0.2 E.U./DL

## 2022-10-17 PROCEDURE — 99207 PR PRENATAL VISIT: CPT | Performed by: FAMILY MEDICINE

## 2022-10-17 PROCEDURE — 81003 URINALYSIS AUTO W/O SCOPE: CPT | Performed by: FAMILY MEDICINE

## 2022-10-17 PROCEDURE — 87653 STREP B DNA AMP PROBE: CPT | Performed by: FAMILY MEDICINE

## 2022-10-19 LAB — GP B STREP DNA SPEC QL NAA+PROBE: NEGATIVE

## 2022-10-20 NOTE — PROGRESS NOTES
Vira is a very pleasant 27-year-old G1, P0 presenting to the clinic today for prenatal care in a normal first pregnancy in the third trimester.  She is overall doing well.  She not really have any contractions thus I am surprised that her cervix was 1 cm dilated.    Discussed to him to call when in labor.  We will plan on seeing her next week and that she has concerns sooner.    Both sister and mother had preeclampsia but blood pressure is normal today with a normal urine test

## 2022-10-24 ENCOUNTER — PRENATAL OFFICE VISIT (OUTPATIENT)
Dept: FAMILY MEDICINE | Facility: CLINIC | Age: 27
End: 2022-10-24
Payer: COMMERCIAL

## 2022-10-24 VITALS
SYSTOLIC BLOOD PRESSURE: 114 MMHG | DIASTOLIC BLOOD PRESSURE: 78 MMHG | WEIGHT: 192.38 LBS | BODY MASS INDEX: 32.01 KG/M2

## 2022-10-24 DIAGNOSIS — L30.9 DERMATITIS: ICD-10-CM

## 2022-10-24 DIAGNOSIS — Z3A.37 37 WEEKS GESTATION OF PREGNANCY: Primary | ICD-10-CM

## 2022-10-24 LAB
ALBUMIN UR-MCNC: NEGATIVE MG/DL
APPEARANCE UR: CLEAR
BILIRUB UR QL STRIP: NEGATIVE
COLOR UR AUTO: YELLOW
GLUCOSE UR STRIP-MCNC: NEGATIVE MG/DL
HGB UR QL STRIP: NEGATIVE
KETONES UR STRIP-MCNC: NEGATIVE MG/DL
LEUKOCYTE ESTERASE UR QL STRIP: NEGATIVE
NITRATE UR QL: NEGATIVE
PH UR STRIP: 7 [PH] (ref 5–7)
SP GR UR STRIP: 1.02 (ref 1–1.03)
UROBILINOGEN UR STRIP-ACNC: 1 E.U./DL

## 2022-10-24 PROCEDURE — 81003 URINALYSIS AUTO W/O SCOPE: CPT | Performed by: FAMILY MEDICINE

## 2022-10-24 PROCEDURE — 99207 PR PRENATAL VISIT: CPT | Performed by: FAMILY MEDICINE

## 2022-10-24 RX ORDER — TRIAMCINOLONE ACETONIDE 1 MG/G
CREAM TOPICAL 2 TIMES DAILY
Qty: 80 G | Refills: 1 | Status: SHIPPED | OUTPATIENT
Start: 2022-10-24 | End: 2022-12-13

## 2022-10-24 NOTE — PROGRESS NOTES
Vira is a very pleasant 27-year-old G1, P0 presenting to the clinic today for prenatal care and a normal first pregnancy in the third trimester.  She is currently 37 weeks and 2 days gestation.    She is having some intermittent contractions.  She has sensitive skin and unfortunately ran out of the lotion she was using and her stretch marks have become very itchy and flared.  She has not been putting anything on them.  Triamcinolone sent to the pharmacy.  Discussed she could try taking allergy medication.  Discussed using Aquaphor several times daily.    Discussed when to call in labor.  Again, patient is a loose 1 cm, 50% plus effaced and -1 station.  
none

## 2022-10-27 ENCOUNTER — TELEPHONE (OUTPATIENT)
Dept: FAMILY MEDICINE | Facility: CLINIC | Age: 27
End: 2022-10-27

## 2022-10-27 ENCOUNTER — LAB (OUTPATIENT)
Dept: LAB | Facility: CLINIC | Age: 27
End: 2022-10-27
Payer: COMMERCIAL

## 2022-10-27 DIAGNOSIS — L30.9 DERMATITIS: ICD-10-CM

## 2022-10-27 DIAGNOSIS — Z13.220 SCREENING FOR HYPERLIPIDEMIA: ICD-10-CM

## 2022-10-27 DIAGNOSIS — Z3A.37 37 WEEKS GESTATION OF PREGNANCY: ICD-10-CM

## 2022-10-27 LAB
ALBUMIN SERPL BCG-MCNC: 3.4 G/DL (ref 3.5–5.2)
ALP SERPL-CCNC: 206 U/L (ref 35–104)
ALT SERPL W P-5'-P-CCNC: 20 U/L (ref 10–35)
ANION GAP SERPL CALCULATED.3IONS-SCNC: 14 MMOL/L (ref 7–15)
AST SERPL W P-5'-P-CCNC: 37 U/L (ref 10–35)
BILIRUB SERPL-MCNC: 0.6 MG/DL
BUN SERPL-MCNC: 12.8 MG/DL (ref 6–20)
CALCIUM SERPL-MCNC: 9.8 MG/DL (ref 8.6–10)
CHLORIDE SERPL-SCNC: 103 MMOL/L (ref 98–107)
CHOLEST SERPL-MCNC: 351 MG/DL
CREAT SERPL-MCNC: 0.75 MG/DL (ref 0.51–0.95)
DEPRECATED HCO3 PLAS-SCNC: 19 MMOL/L (ref 22–29)
ERYTHROCYTE [DISTWIDTH] IN BLOOD BY AUTOMATED COUNT: 12.3 % (ref 10–15)
GFR SERPL CREATININE-BSD FRML MDRD: >90 ML/MIN/1.73M2
GLUCOSE SERPL-MCNC: 123 MG/DL (ref 70–99)
HCT VFR BLD AUTO: 39.1 % (ref 35–47)
HDLC SERPL-MCNC: 67 MG/DL
HGB BLD-MCNC: 13.1 G/DL (ref 11.7–15.7)
LDLC SERPL CALC-MCNC: ABNORMAL MG/DL
LDLC SERPL DIRECT ASSAY-MCNC: 213 MG/DL
MCH RBC QN AUTO: 29.9 PG (ref 26.5–33)
MCHC RBC AUTO-ENTMCNC: 33.5 G/DL (ref 31.5–36.5)
MCV RBC AUTO: 89 FL (ref 78–100)
NONHDLC SERPL-MCNC: 284 MG/DL
PLATELET # BLD AUTO: 235 10E3/UL (ref 150–450)
POTASSIUM SERPL-SCNC: 4 MMOL/L (ref 3.4–5.3)
PROT SERPL-MCNC: 6.6 G/DL (ref 6.4–8.3)
RBC # BLD AUTO: 4.38 10E6/UL (ref 3.8–5.2)
SODIUM SERPL-SCNC: 136 MMOL/L (ref 136–145)
TRIGL SERPL-MCNC: 409 MG/DL
WBC # BLD AUTO: 16.5 10E3/UL (ref 4–11)

## 2022-10-27 PROCEDURE — 80061 LIPID PANEL: CPT

## 2022-10-27 PROCEDURE — 85027 COMPLETE CBC AUTOMATED: CPT

## 2022-10-27 PROCEDURE — 80053 COMPREHEN METABOLIC PANEL: CPT

## 2022-10-27 PROCEDURE — 82239 BILE ACIDS TOTAL: CPT | Mod: 90

## 2022-10-27 PROCEDURE — 36415 COLL VENOUS BLD VENIPUNCTURE: CPT

## 2022-10-27 PROCEDURE — 83721 ASSAY OF BLOOD LIPOPROTEIN: CPT | Mod: 59

## 2022-10-27 PROCEDURE — 99000 SPECIMEN HANDLING OFFICE-LAB: CPT

## 2022-10-27 NOTE — TELEPHONE ENCOUNTER
"Patient calls to follow up on below message. Is inquiring specifically about liver function. No result notes observed for this, will route to provider for review. She also says that she's pregnant and has been \"super itchy\", provider is aware. Patient asking for any further recommendations to help with the itching. Routing to provider for review.    Kisha Davis RN  [facility]  "

## 2022-10-27 NOTE — TELEPHONE ENCOUNTER
Reason for Call:  Other Results     Detailed comments: Pt requesting a call from the nurse or provider with questions about labs done today, please reach out to pt.     Phone Number Patient can be reached at: Home number on file [phone] (home)    Best Time: any    Can we leave a detailed message on this number? YES    Call taken on 10/27/2022 at 4:45 PM by Melyssa Flynn

## 2022-10-28 ENCOUNTER — PRENATAL OFFICE VISIT (OUTPATIENT)
Dept: FAMILY MEDICINE | Facility: CLINIC | Age: 27
End: 2022-10-28
Payer: COMMERCIAL

## 2022-10-28 VITALS
BODY MASS INDEX: 31.47 KG/M2 | WEIGHT: 189.13 LBS | DIASTOLIC BLOOD PRESSURE: 62 MMHG | SYSTOLIC BLOOD PRESSURE: 118 MMHG

## 2022-10-28 DIAGNOSIS — Z3A.37 37 WEEKS GESTATION OF PREGNANCY: Primary | ICD-10-CM

## 2022-10-28 PROBLEM — O26.643 CHOLESTASIS DURING PREGNANCY IN THIRD TRIMESTER: Status: ACTIVE | Noted: 2022-10-28

## 2022-10-28 LAB — BILE AC SERPL-SCNC: 41 UMOL/L

## 2022-10-28 PROCEDURE — 59425 ANTEPARTUM CARE ONLY: CPT | Performed by: FAMILY MEDICINE

## 2022-10-28 NOTE — PROGRESS NOTES
There is a very pleasant 27-year-old G1, P0 presenting to clinic today for prenatal care and a normal first pregnancy in the third trimester.  She is currently 37 weeks and 6 days gestation.    She unfortunately began having a rash starting in her stretch marks this past Monday.  Triamcinolone cream sent to the pharmacy.  Patient contacted me over the next few days stating that the rash was worsening and was in her palms and soles.  Laboratory testing was done which showed increased bile acid and patient will be sent to [facility] for induction due to cholestasis in pregnancy.    While in the hospital we will plan on starting her on Claudine deoxycholic acid 300 mg 3 times daily to potentially help with the itching.    Discussed that her Sun score is at about a 6-7 and thus she will need cervical ripening.  We will plan on Cervidil for cervical ripening if she can tolerate this and hopefully she will be able to get some sleep tonight.    She is [facility] at 630 tonight for hopeful arrival at 730.  If she is not able to get in tonight she will hopefully be able to get in tomorrow morning.

## 2022-10-29 ENCOUNTER — ANESTHESIA EVENT (OUTPATIENT)
Dept: OBGYN | Facility: HOSPITAL | Age: 27
End: 2022-10-29
Payer: COMMERCIAL

## 2022-10-29 ENCOUNTER — ANESTHESIA (OUTPATIENT)
Dept: OBGYN | Facility: HOSPITAL | Age: 27
End: 2022-10-29
Payer: COMMERCIAL

## 2022-10-29 PROCEDURE — 370N000003 HC ANESTHESIA WARD SERVICE

## 2022-10-29 NOTE — ANESTHESIA PROCEDURE NOTES
"Epidural catheter Procedure Note    Pre-Procedure   Staff -        Anesthesiologist:  Myrna Blanchard MD       Performed By: anesthesiologist       Location: OB       Procedure Start/Stop Times: 10/29/2022 4:20 PM and 10/29/2022 4:26 PM       Pre-Anesthestic Checklist: patient identified, IV checked, risks and benefits discussed, informed consent, monitors and equipment checked, pre-op evaluation, at physician/surgeon's request and post-op pain management  Timeout:       Correct Patient: Yes        Correct Procedure: Yes        Correct Site: Yes        Correct Position: Yes   Procedure Documentation  Procedure: epidural catheter       Diagnosis: LE       Patient Position: sitting       Skin prep: Chloraprep       Local skin infiltrated with 2 mL of 1% lidocaine.        Insertion Site: L2-3. (midline approach).       Technique: LORT air        Needle Type: "[employer]y needle       Needle Gauge: 17.        Catheter: 20 G.          Catheter threaded easily.             # of attempts: 1 and  # of redirects:  0    Assessment/Narrative         Paresthesias: No.       Test dose of 5 mL lidocaine 1.5% w/ 1:200,000 epinephrine at.         Test dose negative, 3 minutes after injection, for signs of intravascular, subdural, or intrathecal injection.       Insertion/Infusion Method: LORT air       Aspiration negative for Heme or CSF via Epidural Catheter.       Sensory Level Left: T10.       Sensory Level Right: T10.    Medication(s) Administered   Medication Administration Time: 10/29/2022 4:20 PM     Comments:  Patient tolerated procedure well.  RN and family present.  No PDPH.  VSS.  No issues      FOR [facility] ([facility] [place]) ONLY:   Pain Team Contact information: please page the Pain Team Via [employer]. Search \"Pain\". During daytime hours, please page the attending first. At night please page the resident first.    "

## 2022-10-29 NOTE — ANESTHESIA PREPROCEDURE EVALUATION
Anesthesia Pre-Procedure Evaluation    Patient: Vira Gilmore   MRN: [id number] : 1995        Procedure :   Cervical Ripening       History reviewed. No pertinent past medical history.   Past Surgical History:   Procedure Laterality Date     NO HISTORY OF SURGERY        Allergies   Allergen Reactions     Penicillins Hives      Social History     Tobacco Use     Smoking status: Never     Smokeless tobacco: Never   Substance Use Topics     Alcohol use: Yes     Comment: Not very common. Occasionally      Wt Readings from Last 1 Encounters:   10/28/22 85.8 kg (189 lb 2 oz)        Anesthesia Evaluation            ROS/MED HX  ENT/Pulmonary:  - neg pulmonary ROS     Neurologic:  - neg neurologic ROS     Cardiovascular:  - neg cardiovascular ROS     METS/Exercise Tolerance: >4 METS    Hematologic:  - neg hematologic  ROS     Musculoskeletal:  - neg musculoskeletal ROS     GI/Hepatic: Comment: cholestasis    (+) GERD, liver disease,     Renal/Genitourinary:  - neg Renal ROS     Endo:     (+) Obesity,     Psychiatric/Substance Use:  - neg psychiatric ROS     Infectious Disease:  - neg infectious disease ROS     Malignancy:  - neg malignancy ROS     Other:      (+) Possibly pregnant (gravid), ,         Physical Exam    Airway        Mallampati: III   TM distance: > 3 FB   Neck ROM: full   Mouth opening: > 3 cm    Respiratory Devices and Support         Dental       (+) chipped      Cardiovascular   cardiovascular exam normal          Pulmonary   pulmonary exam normal                OUTSIDE LABS:  CBC:   Lab Results   Component Value Date    WBC 13.8 (H) 10/28/2022    WBC 16.5 (H) 10/27/2022    HGB 12.7 10/28/2022    HGB 13.1 10/27/2022    HCT 37.7 10/28/2022    HCT 39.1 10/27/2022     10/28/2022     10/27/2022     BMP:   Lab Results   Component Value Date     10/28/2022     10/27/2022    POTASSIUM 4.0 10/28/2022    POTASSIUM 4.0 10/27/2022    CHLORIDE 102 10/28/2022    CHLORIDE 103  10/27/2022    CO2 21 (L) 10/28/2022    CO2 19 (L) 10/27/2022    BUN 11.9 10/28/2022    BUN 12.8 10/27/2022    CR 0.74 10/28/2022    CR 0.75 10/27/2022     (H) 10/28/2022     (H) 10/27/2022     COAGS:   Lab Results   Component Value Date    INR 0.93 10/28/2022     POC:   Lab Results   Component Value Date    HCG Positive (A) 04/13/2022     HEPATIC:   Lab Results   Component Value Date    ALBUMIN 3.5 10/28/2022    PROTTOTAL 6.4 10/28/2022    ALT 26 10/28/2022    AST 50 (H) 10/28/2022    ALKPHOS 235 (H) 10/28/2022    BILITOTAL 0.5 10/28/2022     OTHER:   Lab Results   Component Value Date    JONAH 9.1 10/28/2022    TSH 2.04 01/02/2015       Anesthesia Plan    ASA Status:  3   NPO Status:  NPO Appropriate    Anesthesia Type: Epidural.   Induction: N/a.   Maintenance: N/A.        Consents    Anesthesia Plan(s) and associated risks, benefits, and realistic alternatives discussed. Questions answered and patient/representative(s) expressed understanding.     - Discussed: Risks, Benefits and Alternatives for the PROCEDURE were discussed     - Discussed with:  Patient      - Extended Intubation/Ventilatory Support Discussed: No.      - Patient is DNR/DNI Status: No    Use of blood products discussed: No .     Postoperative Care    Post procedure pain management: Labor epidural.        Comments:    Other Comments: Jake Perez MD

## 2022-10-30 NOTE — ANESTHESIA POSTPROCEDURE EVALUATION
Patient: Vira Gilmore    Procedure: * No procedures listed *  Cervical Ripening    Anesthesia Type:  Epidural    Note:  Disposition: Inpatient   Postop Pain Control: Uneventful            Sign Out: Well controlled pain   PONV: No   Neuro/Psych: Uneventful            Sign Out: Acceptable/Baseline neuro status   Airway/Respiratory: Uneventful            Sign Out: Acceptable/Baseline resp. status   CV/Hemodynamics: Uneventful            Sign Out: Acceptable CV status; No obvious hypovolemia; No obvious fluid overload   Other NRE: NONE   DID A NON-ROUTINE EVENT OCCUR? No    Event details/Postop Comments:  Doing well and without complaints.  LE worked well.           Last vitals:  Vitals:    10/30/22 0522 10/30/22 0523 10/30/22 0741   BP: 127/65  112/72   Pulse:   60   Resp:   19   Temp:   36.7  C (98  F)   SpO2:  96% 98%       Electronically Signed By: Blane Anderson MD  October 30, 2022  9:24 AM  
yes

## 2022-11-22 ENCOUNTER — E-VISIT (OUTPATIENT)
Dept: FAMILY MEDICINE | Facility: CLINIC | Age: 27
End: 2022-11-22
Payer: COMMERCIAL

## 2022-11-22 DIAGNOSIS — N61.0 MASTITIS: Primary | ICD-10-CM

## 2022-11-22 PROCEDURE — 99421 OL DIG E/M SVC 5-10 MIN: CPT | Performed by: PHYSICIAN ASSISTANT

## 2022-11-22 RX ORDER — ERYTHROMYCIN 500 MG/1
500 TABLET, DELAYED RELEASE ORAL 2 TIMES DAILY
Qty: 14 TABLET | Refills: 0 | Status: SHIPPED | OUTPATIENT
Start: 2022-11-22 | End: 2022-11-29

## 2022-12-14 ENCOUNTER — OFFICE VISIT (OUTPATIENT)
Dept: FAMILY MEDICINE | Facility: CLINIC | Age: 27
End: 2022-12-14
Payer: COMMERCIAL

## 2022-12-14 VITALS
WEIGHT: 163.25 LBS | SYSTOLIC BLOOD PRESSURE: 100 MMHG | HEIGHT: 65 IN | TEMPERATURE: 98.5 F | RESPIRATION RATE: 12 BRPM | OXYGEN SATURATION: 99 % | DIASTOLIC BLOOD PRESSURE: 58 MMHG | HEART RATE: 70 BPM | BODY MASS INDEX: 27.2 KG/M2

## 2022-12-14 DIAGNOSIS — N76.0 BV (BACTERIAL VAGINOSIS): ICD-10-CM

## 2022-12-14 DIAGNOSIS — B96.89 BV (BACTERIAL VAGINOSIS): ICD-10-CM

## 2022-12-14 DIAGNOSIS — Z00.00 ROUTINE GENERAL MEDICAL EXAMINATION AT A HEALTH CARE FACILITY: ICD-10-CM

## 2022-12-14 DIAGNOSIS — Z30.41 ENCOUNTER FOR SURVEILLANCE OF CONTRACEPTIVE PILLS: ICD-10-CM

## 2022-12-14 LAB
CLUE CELLS: PRESENT
TRICHOMONAS, WET PREP: ABNORMAL
WBC'S/HIGH POWER FIELD, WET PREP: ABNORMAL
YEAST, WET PREP: ABNORMAL

## 2022-12-14 PROCEDURE — 99213 OFFICE O/P EST LOW 20 MIN: CPT | Performed by: FAMILY MEDICINE

## 2022-12-14 PROCEDURE — 99207 PR POST PARTUM EXAM: CPT | Performed by: FAMILY MEDICINE

## 2022-12-14 PROCEDURE — 87210 SMEAR WET MOUNT SALINE/INK: CPT | Performed by: FAMILY MEDICINE

## 2022-12-14 RX ORDER — DESOGESTREL AND ETHINYL ESTRADIOL 0.15-0.03
1 KIT ORAL DAILY
Qty: 84 TABLET | Refills: 3 | Status: SHIPPED | OUTPATIENT
Start: 2022-12-14

## 2022-12-14 RX ORDER — METRONIDAZOLE 7.5 MG/G
1 GEL VAGINAL DAILY
Qty: 70 G | Refills: 0 | Status: SHIPPED | OUTPATIENT
Start: 2022-12-14 | End: 2024-07-29

## 2022-12-14 NOTE — PROGRESS NOTES
Assessment:     Vira Gilmore is a 27 year old female here for postpartum exam at 6 weeks postpartum.    (Z39.2) Routine postpartum follow-up  (primary encounter diagnosis)  Comment:   Plan: Wet prep - Clinic Collect  Wet prep positive for clue cells.  Metronidazole vaginal gel sent to the pharmacy.    Overall doing okay mood wise.  A bit teary when discussing today.  Working on weaning breast-feeding.  Feels as though she will do well when she gets back to work.    She will let me know if she needs a referral to a therapist or medication.    (Z30.41) Encounter for surveillance of contraceptive pills  Comment:   Plan: desogestrel-ethinyl estradiol (APRI) 0.15-30         MG-MCG tablet         (Z00.00) Routine general medical examination at a health care facility  Comment:   Plan: desogestrel-ethinyl estradiol (APRI) 0.15-30         MG-MCG tablet      (N76.0,  B96.89) BV (bacterial vaginosis)  Comment:   Plan: metroNIDAZOLE (METROGEL) 0.75 % vaginal gel      . .    Plan:     1.  Overall doing well.  Mood is a bit down but feels as though she will be improved once restarting work.  Will let me know if she needs anything.  2. Contraception: oral contraceptive  Patient has a vaginal gel for BV  3. No follow-ups on file.      Subjective:      Vira Gilmore is a 27 year old female who presents for a postpartum visit. She is 6 weeks postpartum following a vaginal delivery.  I have fully reviewed the prenatal and intrapartum course. The delivery was at 38 gestational weeks. Outcome: vacuum, low. Postpartum course has been uncomplicated. Baby's course has been uncomplicated. Baby is feeding by bottle.  Bled for  3 weeks postpartum.  She is currently experiencing  No bleeding. Bowel function is normal. Bladder function is normal. Patient has not resumed intercourse. Contraception method is abstinence. Postpartum depression screening score: 5     Some vaginal aching after standing for long periods of time.    The  "following portions of the patient's history were reviewed and updated as appropriate: allergies, current medications and problem list    Review of Systems  General:  Denies problem  Eyes: Denies problem  Ears/Nose/Throat: Denies problem  Cardiovascular: Denies problem  Respiratory:  Denies problem  Gastrointestinal:  Denies problem, Genitourinary: Denies problem  Musculoskeletal:  Denies problem  Skin: Denies problem  Neurologic: Denies problem  Psychiatric: Denies problem  Endocrine: Denies problem  Heme/Lymphatic: Denies problem   Allergic/Immunologic: Denies problem      Objective:      /58   Pulse 70   Temp 98.5  F (36.9  C) (Tympanic)   Resp 12   Ht 1.651 m (5' 5\")   Wt 74 kg (163 lb 4 oz)   LMP 02/05/2022   SpO2 99%   BMI 27.17 kg/m        Physical Exam:  General Appearance: Alert, cooperative, no distress, appears stated age  Head: Normocephalic, without obvious abnormality, atraumatic  Eyes: PERRL, conjunctiva/corneas clear, EOM's intact  Throat: Lips, mucosa, and tongue normal  Neck: Supple, symmetrical, trachea midline, no adenopathy;  thyroid: not enlarged   Lungs: Clear to auscultation bilaterally, respirations unlabored  Breasts: No breast masses, tenderness, asymmetry, or nipple discharge.  Heart: Regular rate and rhythm, S1 and S2 normal   Abdomen: Soft, non-tender, uterus firm, 1-1/2 fingerbreadths of diastases recti  Pelvic:Normally developed genitalia with no external lesions or eruptions. Vagina shows well healing laceration, no inflammation, discharge or tenderness. No cystocele, No rectocele. Uterus firm and below umbilicus.  No adnexal mass or tenderness.  Extremities: Extremities normal, atraumatic, no cyanosis or edema  Skin: Skin color, texture, turgor normal, no rashes or lesions  Lymph nodes: Cervical, supraclavicular, and axillary nodes normal  Neurologic: Normal     "

## 2023-04-03 ENCOUNTER — OFFICE VISIT (OUTPATIENT)
Dept: FAMILY MEDICINE | Facility: CLINIC | Age: 28
End: 2023-04-03
Payer: COMMERCIAL

## 2023-04-03 VITALS
SYSTOLIC BLOOD PRESSURE: 100 MMHG | OXYGEN SATURATION: 90 % | WEIGHT: 170 LBS | BODY MASS INDEX: 28.32 KG/M2 | TEMPERATURE: 98.7 F | HEIGHT: 65 IN | DIASTOLIC BLOOD PRESSURE: 68 MMHG | HEART RATE: 67 BPM

## 2023-04-03 DIAGNOSIS — N63.23 MASS OF LOWER OUTER QUADRANT OF LEFT BREAST: Primary | ICD-10-CM

## 2023-04-03 PROCEDURE — 99213 OFFICE O/P EST LOW 20 MIN: CPT | Performed by: PHYSICIAN ASSISTANT

## 2023-04-03 NOTE — PROGRESS NOTES
"  Assessment & Plan       ICD-10-CM    1. Mass of lower outer quadrant of left breast  N63.23 US Breast Left Limited 1-3 Quadrants          BMI:   Estimated body mass index is 28.29 kg/m  as calculated from the following:    Height as of this encounter: 1.651 m (5' 5\").    Weight as of this encounter: 77.1 kg (170 lb).     JAMES Berry [facility] FERNANDA Constantino is a 27 year old, presenting for the following health issues:  Breast Problem (Patient feels lump in left breast - noticed the lump in January. Had a baby in October. Stopped pumping by end of December. - feels like a nickel in size that is round)         View : No data to display.              History of Present Illness       Reason for visit:  Lump in breast  Symptom onset:  More than a month  Symptom progression:  Staying the same  Had these symptoms before:  No    She eats 2-3 servings of fruits and vegetables daily.She consumes 0 sweetened beverage(s) daily.She exercises with enough effort to increase her heart rate 30 to 60 minutes per day.  She exercises with enough effort to increase her heart rate 4 days per week.   She is taking medications regularly.       Chief Complaint   Patient presents with     Breast Problem     Patient feels lump in left breast - noticed the lump in January. Had a baby in October. Stopped pumping by end of December. - feels like a nickel in size that is round       Thinks she noticed it in January  Had stopped pumping/breastfeeding just before  Was in the shower and noticed the breast looked different  Doesn't think it has changed in size at all  Had sent a my chart message a month ago and based on her description at that time had thought it was a clogged duct so did try the warm compresses but nothing has changed  Not painful      Review of Systems   Remainder of ROS obtained and found to be negative other than that which was documented above        Objective    /68 (BP Location: " "Right arm, Patient Position: Sitting, Cuff Size: Adult Regular)   Pulse 67   Temp 98.7  F (37.1  C) (Tympanic)   Ht 1.651 m (5' 5\")   Wt 77.1 kg (170 lb)   LMP 03/08/2023 (Exact Date)   SpO2 90%   BMI 28.29 kg/m    Body mass index is 28.29 kg/m .  Physical Exam   GENERAL: healthy, alert and no distress  BREAST: no masses palpable lumps on right side  Movable superficial feeling well defined mass on the right breast between the 3 o'clock and 5 o'clock position                       "

## 2023-04-11 ENCOUNTER — ANCILLARY PROCEDURE (OUTPATIENT)
Dept: MAMMOGRAPHY | Facility: CLINIC | Age: 28
End: 2023-04-11
Attending: PHYSICIAN ASSISTANT
Payer: COMMERCIAL

## 2023-04-11 DIAGNOSIS — N63.23 MASS OF LOWER OUTER QUADRANT OF LEFT BREAST: ICD-10-CM

## 2023-04-11 PROCEDURE — 76642 ULTRASOUND BREAST LIMITED: CPT | Mod: LT

## 2023-04-23 ENCOUNTER — HEALTH MAINTENANCE LETTER (OUTPATIENT)
Age: 28
End: 2023-04-23

## 2023-09-16 NOTE — TELEPHONE ENCOUNTER
3/18/2017    Call Regarding Preventive Health Screening Cervical/PAP    Attempt 1    Message on voicemail     Comments:       Outreach   CC    
6/15/2017    Call Regarding Preventive Health Screening Cervical/PAP    Attempt 3    Message voicemail full    Comments:       Outreach   PRIYA            
6/2/2017    Call Regarding Preventive Health Screening Cervical/PAP    Attempt 2    Message Mailbox full    Comments:       Outreach   CC    
Adjustment Disorder with depressed mood

## 2024-06-30 ENCOUNTER — HEALTH MAINTENANCE LETTER (OUTPATIENT)
Age: 29
End: 2024-06-30

## 2024-07-29 ENCOUNTER — OFFICE VISIT (OUTPATIENT)
Dept: FAMILY MEDICINE | Facility: CLINIC | Age: 29
End: 2024-07-29
Payer: COMMERCIAL

## 2024-07-29 VITALS
HEART RATE: 70 BPM | HEIGHT: 65 IN | BODY MASS INDEX: 27.66 KG/M2 | OXYGEN SATURATION: 100 % | DIASTOLIC BLOOD PRESSURE: 66 MMHG | WEIGHT: 166 LBS | SYSTOLIC BLOOD PRESSURE: 124 MMHG | TEMPERATURE: 98.5 F

## 2024-07-29 DIAGNOSIS — Z00.00 ROUTINE GENERAL MEDICAL EXAMINATION AT A HEALTH CARE FACILITY: Primary | ICD-10-CM

## 2024-07-29 DIAGNOSIS — Z83.49 FAMILY HISTORY OF THYROID DISEASE: ICD-10-CM

## 2024-07-29 LAB
T4 FREE SERPL-MCNC: 1.25 NG/DL (ref 0.9–1.7)
TSH SERPL DL<=0.005 MIU/L-ACNC: 5.4 UIU/ML (ref 0.3–4.2)

## 2024-07-29 PROCEDURE — 86376 MICROSOMAL ANTIBODY EACH: CPT | Performed by: PHYSICIAN ASSISTANT

## 2024-07-29 PROCEDURE — 84443 ASSAY THYROID STIM HORMONE: CPT | Performed by: PHYSICIAN ASSISTANT

## 2024-07-29 PROCEDURE — 99395 PREV VISIT EST AGE 18-39: CPT | Performed by: PHYSICIAN ASSISTANT

## 2024-07-29 PROCEDURE — 86800 THYROGLOBULIN ANTIBODY: CPT | Performed by: PHYSICIAN ASSISTANT

## 2024-07-29 PROCEDURE — 36415 COLL VENOUS BLD VENIPUNCTURE: CPT | Performed by: PHYSICIAN ASSISTANT

## 2024-07-29 PROCEDURE — 84439 ASSAY OF FREE THYROXINE: CPT | Performed by: PHYSICIAN ASSISTANT

## 2024-07-29 SDOH — HEALTH STABILITY: PHYSICAL HEALTH: ON AVERAGE, HOW MANY MINUTES DO YOU ENGAGE IN EXERCISE AT THIS LEVEL?: 30 MIN

## 2024-07-29 SDOH — HEALTH STABILITY: PHYSICAL HEALTH: ON AVERAGE, HOW MANY DAYS PER WEEK DO YOU ENGAGE IN MODERATE TO STRENUOUS EXERCISE (LIKE A BRISK WALK)?: 4 DAYS

## 2024-07-29 ASSESSMENT — SOCIAL DETERMINANTS OF HEALTH (SDOH): HOW OFTEN DO YOU GET TOGETHER WITH FRIENDS OR RELATIVES?: ONCE A WEEK

## 2024-07-29 NOTE — PATIENT INSTRUCTIONS
Patient Education   Preventive Care Advice   This is general advice given by our system to help you stay healthy. However, your care team may have specific advice just for you. Please talk to your care team about your preventive care needs.  Nutrition  Eat 5 or more servings of fruits and vegetables each day.  Try wheat bread, brown rice and whole grain pasta (instead of white bread, rice, and pasta).  Get enough calcium and vitamin D. Check the label on foods and aim for 100% of the RDA (recommended daily allowance).  Lifestyle  Exercise at least 150 minutes each week  (30 minutes a day, 5 days a week).  Do muscle strengthening activities 2 days a week. These help control your weight and prevent disease.  No smoking.  Wear sunscreen to prevent skin cancer.  Have a dental exam and cleaning every 6 months.  Yearly exams  See your health care team every year to talk about:  Any changes in your health.  Any medicines your care team has prescribed.  Preventive care, family planning, and ways to prevent chronic diseases.  Shots (vaccines)   HPV shots (up to age 26), if you've never had them before.  Hepatitis B shots (up to age 59), if you've never had them before.  COVID-19 shot: Get this shot when it's due.  Flu shot: Get a flu shot every year.  Tetanus shot: Get a tetanus shot every 10 years.  Pneumococcal, hepatitis A, and RSV shots: Ask your care team if you need these based on your risk.  Shingles shot (for age 50 and up)  General health tests  Diabetes screening:  Starting at age 35, Get screened for diabetes at least every 3 years.  If you are younger than age 35, ask your care team if you should be screened for diabetes.  Cholesterol test: At age 39, start having a cholesterol test every 5 years, or more often if advised.  Bone density scan (DEXA): At age 50, ask your care team if you should have this scan for osteoporosis (brittle bones).  Hepatitis C: Get tested at least once in your life.  STIs (sexually  transmitted infections)  Before age 24: Ask your care team if you should be screened for STIs.  After age 24: Get screened for STIs if you're at risk. You are at risk for STIs (including HIV) if:  You are sexually active with more than one person.  You don't use condoms every time.  You or a partner was diagnosed with a sexually transmitted infection.  If you are at risk for HIV, ask about PrEP medicine to prevent HIV.  Get tested for HIV at least once in your life, whether you are at risk for HIV or not.  Cancer screening tests  Cervical cancer screening: If you have a cervix, begin getting regular cervical cancer screening tests starting at age 21.  Breast cancer scan (mammogram): If you've ever had breasts, begin having regular mammograms starting at age 40. This is a scan to check for breast cancer.  Colon cancer screening: It is important to start screening for colon cancer at age 45.  Have a colonoscopy test every 10 years (or more often if you're at risk) Or, ask your provider about stool tests like a FIT test every year or Cologuard test every 3 years.  To learn more about your testing options, visit:   .  For help making a decision, visit:   https://bit.ly/zw89290.  Prostate cancer screening test: If you have a prostate, ask your care team if a prostate cancer screening test (PSA) at age 55 is right for you.  Lung cancer screening: If you are a current or former smoker ages 50 to 80, ask your care team if ongoing lung cancer screenings are right for you.  For informational purposes only. Not to replace the advice of your health care provider. Copyright   2023 [employer]. All rights reserved. Clinically reviewed by the [facility] Transitions Program. [employer] 196046 - REV 01/24.

## 2024-07-29 NOTE — PROGRESS NOTES
"Preventive Care Visit  [facility] FERNANDA Collier PA-C, Family Medicine  Jul 29, 2024      Assessment & Plan       ICD-10-CM    1. Routine general medical examination at a health care facility  Z00.00       2. Family history of thyroid disease  Z83.49 TSH with free T4 reflex     Anti thyroglobulin antibody     Thyroid peroxidase antibody            Patient has been advised of split billing requirements and indicates understanding: Yes        BMI  Estimated body mass index is 27.62 kg/m  as calculated from the following:    Height as of this encounter: 1.651 m (5' 5\").    Weight as of this encounter: 75.3 kg (166 lb).       Counseling  Appropriate preventive services were addressed with this patient via screening, questionnaire, or discussion as appropriate for fall prevention, nutrition, physical activity, Tobacco-use cessation, weight loss and cognition.  Checklist reviewing preventive services available has been given to the patient.  Reviewed patient's diet, addressing concerns and/or questions.   She is at risk for psychosocial distress and has been provided with information to reduce risk.           Surekha Constantino is a 28 year old, presenting for the following:  Physical        7/29/2024    11:07 AM   Additional Questions   Roomed by BLAKE Rodriguez        Health Care Directive  Patient does not have a Health Care Directive or Living Will: Discussed advance care planning with patient; however, patient declined at this time.    HPI    -Her mom and sister have thyroid issues. She is wondering if she should be tested. Harder for her to lose weight.         7/29/2024   General Health   How would you rate your overall physical health? Good   Feel stress (tense, anxious, or unable to sleep) Only a little      (!) STRESS CONCERN      7/29/2024   Nutrition   Three or more servings of calcium each day? Yes   Diet: Regular (no restrictions)   How many servings of fruit and vegetables per day? (!) 0-1 "   How many sweetened beverages each day? 0-1            7/29/2024   Exercise   Days per week of moderate/strenous exercise 4 days   Average minutes spent exercising at this level 30 min            7/29/2024   Social Factors   Frequency of gathering with friends or relatives Once a week   Worry food won't last until get money to buy more No   Food not last or not have enough money for food? No   Do you have housing? (Housing is defined as stable permanent housing and does not include staying ouside in a car, in a tent, in an abandoned building, in an overnight shelter, or couch-surfing.) Yes   Are you worried about losing your housing? No   Lack of transportation? No   Unable to get utilities (heat,electricity)? No            7/29/2024   Dental   Dentist two times every year? Yes            7/29/2024   TB Screening   Were you born outside of the [place]? No            Today's PHQ-2 Score:       7/29/2024    11:07 AM   PHQ-2 ( 1999 [employer])   Q1: Little interest or pleasure in doing things 0   Q2: Feeling down, depressed or hopeless 1   PHQ-2 Score 1   Q1: Little interest or pleasure in doing things Not at all   Q2: Feeling down, depressed or hopeless Several days   PHQ-2 Score 1           7/29/2024   Substance Use   Alcohol more than 3/day or more than 7/wk No   Do you use any other substances recreationally? No        Social History     Tobacco Use    Smoking status: Never    Smokeless tobacco: Never   Vaping Use    Vaping status: Never Used   Substance Use Topics    Alcohol use: Yes     Comment: Not very common. Occasionally    Drug use: No         4/11/2023   LAST FHS-7 RESULTS   1st degree relative breast or ovarian cancer No   Any relative bilateral breast cancer No   Any male have breast cancer No   Any ONE woman have BOTH breast AND ovarian cancer No   Any woman with breast cancer before 50yrs No   2 or more relatives with breast AND/OR ovarian cancer No   2 or more relatives with breast AND/OR bowel cancer No     "      Mammogram Screening - Patient under 40 years of age: Routine Mammogram Screening not recommended.         7/29/2024   STI Screening   New sexual partner(s) since last STI/HIV test? No        History of abnormal Pap smear: No - age 30- 64 PAP with HPV every 5 years recommended        5/3/2022     4:02 PM 11/19/2018     4:38 PM   PAP / HPV   PAP Negative for Intraepithelial Lesion or Malignancy (NILM)     PAP (Historical)  NIL            7/29/2024   Contraception/Family Planning   Questions about contraception or family planning (!) YES         Reviewed and updated as needed this visit by Provider                    BP Readings from Last 3 Encounters:   07/29/24 124/66   04/03/23 100/68   12/14/22 100/58    Wt Readings from Last 3 Encounters:   07/29/24 75.3 kg (166 lb)   04/03/23 77.1 kg (170 lb)   12/14/22 74 kg (163 lb 4 oz)                      Review of Systems  CONSTITUTIONAL: NEGATIVE for fever, chills, change in weight  INTEGUMENTARY/SKIN: NEGATIVE for worrisome rashes, moles or lesions  EYES: NEGATIVE for vision changes or irritation  ENT/MOUTH: NEGATIVE for ear, mouth and throat problems  RESP: NEGATIVE for significant cough or SOB  BREAST: NEGATIVE for masses, tenderness or discharge  CV: NEGATIVE for chest pain, palpitations or peripheral edema  GI: NEGATIVE for nausea, abdominal pain, heartburn, or change in bowel habits  : NEGATIVE for frequency, dysuria, or hematuria  MUSCULOSKELETAL: NEGATIVE for significant arthralgias or myalgia  NEURO: NEGATIVE for weakness, dizziness or paresthesias  ENDOCRINE: NEGATIVE for temperature intolerance, skin/hair changes  HEME: NEGATIVE for bleeding problems  PSYCHIATRIC: NEGATIVE for changes in mood or affect     Objective    Exam  /66   Pulse 70   Temp 98.5  F (36.9  C) (Tympanic)   Ht 1.651 m (5' 5\")   Wt 75.3 kg (166 lb)   SpO2 100%   Breastfeeding No   BMI 27.62 kg/m     Estimated body mass index is 27.62 kg/m  as calculated from the " "following:    Height as of this encounter: 1.651 m (5' 5\").    Weight as of this encounter: 75.3 kg (166 lb).    Physical Exam  GENERAL: alert and no distress  EYES: Eyes grossly normal to inspection, PERRL and conjunctivae and sclerae normal  HENT: ear canals and TM's normal, nose and mouth without ulcers or lesions  NECK: no adenopathy, no asymmetry, masses, or scars  RESP: lungs clear to auscultation - no rales, rhonchi or wheezes  BREAST: normal without masses, tenderness or nipple discharge and no palpable axillary masses or adenopathy  CV: regular rate and rhythm, normal S1 S2, no S3 or S4, no murmur, click or rub, no peripheral edema  ABDOMEN: soft, nontender, no hepatosplenomegaly, no masses and bowel sounds normal  MS: no gross musculoskeletal defects noted, no edema  SKIN: no suspicious lesions or rashes  NEURO: Normal strength and tone, mentation intact and speech normal  PSYCH: mentation appears normal, affect normal/bright        Signed Electronically by: Obdulia Collier PA-C    " TCB @ 24h 6.5- serum sent

## 2024-07-30 DIAGNOSIS — E03.8 SUBCLINICAL HYPOTHYROIDISM: Primary | ICD-10-CM

## 2024-07-30 LAB
THYROGLOB AB SERPL IA-ACNC: <20 IU/ML
THYROPEROXIDASE AB SERPL-ACNC: 526 IU/ML

## 2024-08-29 ENCOUNTER — LAB (OUTPATIENT)
Dept: LAB | Facility: CLINIC | Age: 29
End: 2024-08-29
Payer: COMMERCIAL

## 2024-08-29 DIAGNOSIS — E03.8 SUBCLINICAL HYPOTHYROIDISM: ICD-10-CM

## 2024-08-29 DIAGNOSIS — Z13.220 SCREENING FOR HYPERLIPIDEMIA: Primary | ICD-10-CM

## 2024-08-29 LAB
CHOLEST SERPL-MCNC: 169 MG/DL
FASTING STATUS PATIENT QL REPORTED: NO
HDLC SERPL-MCNC: 48 MG/DL
LDLC SERPL CALC-MCNC: 105 MG/DL
NONHDLC SERPL-MCNC: 121 MG/DL
TRIGL SERPL-MCNC: 80 MG/DL
TSH SERPL DL<=0.005 MIU/L-ACNC: 2.42 UIU/ML (ref 0.3–4.2)

## 2024-08-29 PROCEDURE — 36415 COLL VENOUS BLD VENIPUNCTURE: CPT

## 2024-08-29 PROCEDURE — 84443 ASSAY THYROID STIM HORMONE: CPT

## 2024-08-29 PROCEDURE — 80061 LIPID PANEL: CPT

## 2024-11-18 ENCOUNTER — HOSPITAL ENCOUNTER (OUTPATIENT)
Dept: ULTRASOUND IMAGING | Facility: CLINIC | Age: 29
Discharge: HOME | End: 2024-11-18
Attending: FAMILY MEDICINE | Admitting: FAMILY MEDICINE
Payer: COMMERCIAL

## 2024-11-18 DIAGNOSIS — N91.2 AMENORRHEA: ICD-10-CM

## 2024-11-18 PROCEDURE — 76801 OB US < 14 WKS SINGLE FETUS: CPT

## 2024-11-21 ENCOUNTER — MYC MEDICAL ADVICE (OUTPATIENT)
Dept: FAMILY MEDICINE | Facility: CLINIC | Age: 29
End: 2024-11-21
Payer: COMMERCIAL

## 2024-11-22 ENCOUNTER — APPOINTMENT (OUTPATIENT)
Dept: ULTRASOUND IMAGING | Facility: CLINIC | Age: 29
End: 2024-11-22
Attending: NURSE PRACTITIONER

## 2024-11-22 ENCOUNTER — HOSPITAL ENCOUNTER (EMERGENCY)
Facility: CLINIC | Age: 29
Discharge: HOME | End: 2024-11-22
Attending: NURSE PRACTITIONER

## 2024-11-22 VITALS
SYSTOLIC BLOOD PRESSURE: 114 MMHG | WEIGHT: 165 LBS | HEIGHT: 65 IN | HEART RATE: 92 BPM | RESPIRATION RATE: 18 BRPM | BODY MASS INDEX: 27.49 KG/M2 | TEMPERATURE: 99 F | DIASTOLIC BLOOD PRESSURE: 75 MMHG | OXYGEN SATURATION: 99 %

## 2024-11-22 DIAGNOSIS — R10.2 PELVIC PAIN IN PREGNANCY: ICD-10-CM

## 2024-11-22 DIAGNOSIS — O26.899 PELVIC PAIN IN PREGNANCY: ICD-10-CM

## 2024-11-22 PROCEDURE — 76801 OB US < 14 WKS SINGLE FETUS: CPT

## 2024-11-22 PROCEDURE — 99284 EMERGENCY DEPT VISIT MOD MDM: CPT | Performed by: NURSE PRACTITIONER

## 2024-11-22 PROCEDURE — 99284 EMERGENCY DEPT VISIT MOD MDM: CPT | Mod: 25

## 2024-11-22 ASSESSMENT — COLUMBIA-SUICIDE SEVERITY RATING SCALE - C-SSRS
2. HAVE YOU ACTUALLY HAD ANY THOUGHTS OF KILLING YOURSELF IN THE PAST MONTH?: NO
6. HAVE YOU EVER DONE ANYTHING, STARTED TO DO ANYTHING, OR PREPARED TO DO ANYTHING TO END YOUR LIFE?: NO
1. IN THE PAST MONTH, HAVE YOU WISHED YOU WERE DEAD OR WISHED YOU COULD GO TO SLEEP AND NOT WAKE UP?: NO

## 2024-11-22 ASSESSMENT — ACTIVITIES OF DAILY LIVING (ADL)
ADLS_ACUITY_SCORE: 0
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
## 2024-11-22 NOTE — DISCHARGE INSTRUCTIONS
Ultrasound was reassuring, exam findings are reassuring.  Without having active bleeding believe that there is no need for concern today.  The way the pain was described this is believed to be round ligament pain.  If you develop vaginal bleeding, worsening of abdominal or pelvic pain you should return for further evaluation promptly.  The OB on-call provider was contacted and RhoGAM is not recommended at this time.
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
## 2024-12-03 LAB
ABO/RH(D): NORMAL
ANTIBODY SCREEN: NEGATIVE
SPECIMEN EXPIRATION DATE: NORMAL

## 2024-12-04 ENCOUNTER — PRENATAL OFFICE VISIT (OUTPATIENT)
Dept: FAMILY MEDICINE | Facility: CLINIC | Age: 29
End: 2024-12-04
Payer: COMMERCIAL

## 2024-12-04 VITALS
RESPIRATION RATE: 12 BRPM | SYSTOLIC BLOOD PRESSURE: 102 MMHG | HEIGHT: 65 IN | OXYGEN SATURATION: 98 % | TEMPERATURE: 98.8 F | BODY MASS INDEX: 28.72 KG/M2 | HEART RATE: 66 BPM | DIASTOLIC BLOOD PRESSURE: 68 MMHG | WEIGHT: 172.38 LBS

## 2024-12-04 DIAGNOSIS — Z23 NEED FOR PROPHYLACTIC VACCINATION AND INOCULATION AGAINST INFLUENZA: ICD-10-CM

## 2024-12-04 DIAGNOSIS — Z34.81 ENCOUNTER FOR SUPERVISION OF OTHER NORMAL PREGNANCY IN FIRST TRIMESTER: Primary | ICD-10-CM

## 2024-12-04 LAB
ALBUMIN SERPL BCG-MCNC: 3.9 G/DL (ref 3.5–5.2)
ALBUMIN UR-MCNC: NEGATIVE MG/DL
ALP SERPL-CCNC: 82 U/L (ref 40–150)
ALT SERPL W P-5'-P-CCNC: 15 U/L (ref 0–50)
ANION GAP SERPL CALCULATED.3IONS-SCNC: 11 MMOL/L (ref 7–15)
APPEARANCE UR: CLEAR
AST SERPL W P-5'-P-CCNC: 25 U/L (ref 0–45)
BILIRUB SERPL-MCNC: 0.2 MG/DL
BILIRUB UR QL STRIP: NEGATIVE
BUN SERPL-MCNC: 13.7 MG/DL (ref 6–20)
CALCIUM SERPL-MCNC: 9.5 MG/DL (ref 8.8–10.4)
CHLORIDE SERPL-SCNC: 98 MMOL/L (ref 98–107)
COLOR UR AUTO: YELLOW
CREAT SERPL-MCNC: 0.64 MG/DL (ref 0.51–0.95)
EGFRCR SERPLBLD CKD-EPI 2021: >90 ML/MIN/1.73M2
ERYTHROCYTE [DISTWIDTH] IN BLOOD BY AUTOMATED COUNT: 12.3 % (ref 10–15)
GLUCOSE SERPL-MCNC: 72 MG/DL (ref 70–99)
GLUCOSE UR STRIP-MCNC: NEGATIVE MG/DL
HCO3 SERPL-SCNC: 26 MMOL/L (ref 22–29)
HCT VFR BLD AUTO: 35.5 % (ref 35–47)
HGB BLD-MCNC: 13 G/DL (ref 11.7–15.7)
HGB UR QL STRIP: NEGATIVE
KETONES UR STRIP-MCNC: NEGATIVE MG/DL
LEUKOCYTE ESTERASE UR QL STRIP: NEGATIVE
MCH RBC QN AUTO: 29 PG (ref 26.5–33)
MCHC RBC AUTO-ENTMCNC: 36.6 G/DL (ref 31.5–36.5)
MCV RBC AUTO: 79 FL (ref 78–100)
NITRATE UR QL: NEGATIVE
PH UR STRIP: 6 [PH] (ref 5–7)
PLATELET # BLD AUTO: 292 10E3/UL (ref 150–450)
POTASSIUM SERPL-SCNC: 3.8 MMOL/L (ref 3.4–5.3)
PROT SERPL-MCNC: 7.3 G/DL (ref 6.4–8.3)
RBC # BLD AUTO: 4.49 10E6/UL (ref 3.8–5.2)
SODIUM SERPL-SCNC: 135 MMOL/L (ref 135–145)
SP GR UR STRIP: 1.01 (ref 1–1.03)
TSH SERPL DL<=0.005 MIU/L-ACNC: 3.48 UIU/ML (ref 0.3–4.2)
UROBILINOGEN UR STRIP-ACNC: 0.2 E.U./DL
WBC # BLD AUTO: 15.8 10E3/UL (ref 4–11)

## 2024-12-04 PROCEDURE — 85027 COMPLETE CBC AUTOMATED: CPT | Performed by: FAMILY MEDICINE

## 2024-12-04 PROCEDURE — 86850 RBC ANTIBODY SCREEN: CPT | Performed by: FAMILY MEDICINE

## 2024-12-04 PROCEDURE — 86900 BLOOD TYPING SEROLOGIC ABO: CPT | Performed by: FAMILY MEDICINE

## 2024-12-04 PROCEDURE — 80053 COMPREHEN METABOLIC PANEL: CPT | Performed by: FAMILY MEDICINE

## 2024-12-04 PROCEDURE — 86780 TREPONEMA PALLIDUM: CPT | Performed by: FAMILY MEDICINE

## 2024-12-04 PROCEDURE — 36415 COLL VENOUS BLD VENIPUNCTURE: CPT | Performed by: FAMILY MEDICINE

## 2024-12-04 PROCEDURE — 86901 BLOOD TYPING SEROLOGIC RH(D): CPT | Performed by: FAMILY MEDICINE

## 2024-12-04 PROCEDURE — 87389 HIV-1 AG W/HIV-1&-2 AB AG IA: CPT | Performed by: FAMILY MEDICINE

## 2024-12-04 PROCEDURE — 84443 ASSAY THYROID STIM HORMONE: CPT | Performed by: FAMILY MEDICINE

## 2024-12-04 PROCEDURE — 81003 URINALYSIS AUTO W/O SCOPE: CPT | Performed by: FAMILY MEDICINE

## 2024-12-04 NOTE — PATIENT INSTRUCTIONS
Pregnancy Information    We will see you every 4 weeks until 32 weeks, every 2 weeks until 36 weeks and every week until delivery    [facility] for delivery - #[phone]     US for dates at around 8-10 weeks  - [phone] to schedule      Testing for trisomies (downs syndrome, trisomy 13 and trisomy 18) -    - US testing and bloodwork at [facility] ([place]) at around 13 weeks (no later than 13w6d)  OR   - [facility] genetic testing done any time after 10 weeks ($249) - can also test gender chromosomes    US for gender and fetal survey at about 20 weeks    Blood sugar test between 24 and 28 weeks    Continue to take a prenatal vitamin - I recommend one with 800 mcg or folic acid, 27mg of elemental iron and 150 mcg of iodine     I also recommend shooting for 1,000-1,300 mg/day of Calcium (either through vitamin or diet)    Nausea in pregnancy:  I would recommend vitamin B6 for nausea.  Dosing as follows:  -Pyridoxine (B6) - 25 mg orally every six to eight hours; the maximum treatment dose suggested for pregnant women is 200 mg/day  -You can also add on Unisom (Doxyamine) - 20mg at night and up to two 10 mg doses throughout the day (for a total of up to 40mg/day) although it will likely make you drowsy

## 2024-12-05 LAB
BACTERIA UR CULT: NO GROWTH
HBV SURFACE AG SERPL QL IA: NONREACTIVE
HIV 1+2 AB+HIV1 P24 AG SERPL QL IA: NONREACTIVE
RUBV IGG SERPL QL IA: 4.01 INDEX
RUBV IGG SERPL QL IA: POSITIVE
T PALLIDUM AB SER QL: NONREACTIVE

## 2024-12-05 NOTE — PROGRESS NOTES
Assessment/Plan:    Vira Gilmore is a 29 year old female presenting for:    Encounter for supervision of other normal pregnancy in first trimester  Patient declines genetic testing  - ABO/Rh type and screen  - CBC with platelets  - HIV Antigen Antibody Combo  - Rubella Antibody IgG  - Hepatitis B surface antigen  - Treponema Abs w Reflex to RPR and Titer  - Urinalysis Macroscopic  - Urine Culture  - Comprehensive metabolic panel (BMP + Alb, Alk Phos, ALT, AST, Total. Bili, TP)  - TSH with free T4 reflex  - ABO/Rh type and screen  - CBC with platelets  - HIV Antigen Antibody Combo  - Rubella Antibody IgG  - Hepatitis B surface antigen  - Treponema Abs w Reflex to RPR and Titer  - Urinalysis Macroscopic  - Urine Culture  - Comprehensive metabolic panel (BMP + Alb, Alk Phos, ALT, AST, Total. Bili, TP)  - TSH with free T4 reflex    Need for prophylactic vaccination and inoculation against influenza      There are no discontinued medications.        Chief Complaint:  Prenatal Care and Imm/Inj        Subjective:   Vira Gilmore is a pleasant 29-year-old G2, P1 who presents to the clinic today at 10 weeks and 4 days gestation for a first OB appointment.    She saw one of my partners for pregnancy confirmation.  This is her second pregnancy for her.  First pregnancy was uncomplicated until the end of the third trimester when she developed cholestasis in pregnancy.  She was induced successfully and had a vacuum-assisted vaginal delivery due to asynclitic presentation.  This was at about 38 weeks.  Baby was 7-1/2 pounds.    She is overall feeling well.  Some nausea but nothing severe.  Taking a prenatal vitamin.      12 point review of systems completed and negative except for what has been described above    History   Smoking Status    Never   Smokeless Tobacco    Never       No current outpatient medications on file.      Objective:  Vitals:    12/04/24 1607   BP: 102/68   Pulse: 66   Resp: 12   Temp: 98.8  F (37.1  " C)   TempSrc: Tympanic   SpO2: 98%   Weight: 78.2 kg (172 lb 6 oz)   Height: 1.651 m (5' 5\")       Body mass index is 28.68 kg/m .    Vital signs reviewed and stable  General: No acute distress  Psych: Appropriate affect  HEENT: moist mucous membranes, pupils equal, round, reactive to light and accomodation, tympanic membranes are pearly grey bilaterally  Lymph: no cervical or supraclavicular lymphadenopathy  Cardiovascular: regular rate and rhythm with no murmur  Pulmonary: clear to auscultation bilaterally with no wheeze  Abdomen: soft, non tender, non distended with normo-active bowel sounds  Extremities: warm and well perfused with no edema  Skin: warm and dry with no rash    Bedside ultrasound was done and showed single intrauterine gestation positive for fetal heartbeat and movement.     This note has been dictated and transcribed using voice recognition software.   Any errors in transcription are unintentional and inherent to the software.  "